# Patient Record
Sex: FEMALE | URBAN - METROPOLITAN AREA
[De-identification: names, ages, dates, MRNs, and addresses within clinical notes are randomized per-mention and may not be internally consistent; named-entity substitution may affect disease eponyms.]

---

## 2018-06-10 ENCOUNTER — EMERGENCY (EMERGENCY)
Facility: HOSPITAL | Age: 78
LOS: 1 days | Discharge: HOME | End: 2018-06-10
Attending: EMERGENCY MEDICINE | Admitting: EMERGENCY MEDICINE
Payer: MEDICAID

## 2018-06-10 VITALS
HEART RATE: 89 BPM | SYSTOLIC BLOOD PRESSURE: 159 MMHG | TEMPERATURE: 98 F | DIASTOLIC BLOOD PRESSURE: 91 MMHG | OXYGEN SATURATION: 98 % | RESPIRATION RATE: 16 BRPM

## 2018-06-10 VITALS
RESPIRATION RATE: 16 BRPM | TEMPERATURE: 99 F | OXYGEN SATURATION: 99 % | HEART RATE: 61 BPM | DIASTOLIC BLOOD PRESSURE: 88 MMHG | SYSTOLIC BLOOD PRESSURE: 170 MMHG

## 2018-06-10 LAB
ALBUMIN SERPL ELPH-MCNC: 4.3 G/DL — SIGNIFICANT CHANGE UP (ref 3.3–5)
ALP SERPL-CCNC: 84 U/L — SIGNIFICANT CHANGE UP (ref 40–120)
ALT FLD-CCNC: 11 U/L — SIGNIFICANT CHANGE UP (ref 4–33)
APPEARANCE UR: CLEAR — SIGNIFICANT CHANGE UP
AST SERPL-CCNC: 21 U/L — SIGNIFICANT CHANGE UP (ref 4–32)
BACTERIA # UR AUTO: SIGNIFICANT CHANGE UP
BASE EXCESS BLDV CALC-SCNC: 7.1 MMOL/L — SIGNIFICANT CHANGE UP
BASOPHILS # BLD AUTO: 0.01 K/UL — SIGNIFICANT CHANGE UP (ref 0–0.2)
BASOPHILS NFR BLD AUTO: 0.2 % — SIGNIFICANT CHANGE UP (ref 0–2)
BILIRUB SERPL-MCNC: 0.5 MG/DL — SIGNIFICANT CHANGE UP (ref 0.2–1.2)
BILIRUB UR-MCNC: NEGATIVE — SIGNIFICANT CHANGE UP
BLOOD GAS VENOUS - CREATININE: 0.41 MG/DL — LOW (ref 0.5–1.3)
BLOOD UR QL VISUAL: NEGATIVE — SIGNIFICANT CHANGE UP
BUN SERPL-MCNC: 8 MG/DL — SIGNIFICANT CHANGE UP (ref 7–23)
CALCIUM SERPL-MCNC: 9 MG/DL — SIGNIFICANT CHANGE UP (ref 8.4–10.5)
CHLORIDE BLDV-SCNC: 100 MMOL/L — SIGNIFICANT CHANGE UP (ref 96–108)
CHLORIDE SERPL-SCNC: 95 MMOL/L — LOW (ref 98–107)
CO2 SERPL-SCNC: 30 MMOL/L — SIGNIFICANT CHANGE UP (ref 22–31)
COLOR SPEC: SIGNIFICANT CHANGE UP
CREAT SERPL-MCNC: 0.49 MG/DL — LOW (ref 0.5–1.3)
EOSINOPHIL # BLD AUTO: 0.08 K/UL — SIGNIFICANT CHANGE UP (ref 0–0.5)
EOSINOPHIL NFR BLD AUTO: 1.4 % — SIGNIFICANT CHANGE UP (ref 0–6)
GAS PNL BLDV: 133 MMOL/L — LOW (ref 136–146)
GLUCOSE BLDV-MCNC: 101 — HIGH (ref 70–99)
GLUCOSE SERPL-MCNC: 100 MG/DL — HIGH (ref 70–99)
GLUCOSE UR-MCNC: NEGATIVE — SIGNIFICANT CHANGE UP
HCO3 BLDV-SCNC: 29 MMOL/L — HIGH (ref 20–27)
HCT VFR BLD CALC: 35.1 % — SIGNIFICANT CHANGE UP (ref 34.5–45)
HCT VFR BLDV CALC: 38.3 % — SIGNIFICANT CHANGE UP (ref 34.5–45)
HGB BLD-MCNC: 11.8 G/DL — SIGNIFICANT CHANGE UP (ref 11.5–15.5)
HGB BLDV-MCNC: 12.5 G/DL — SIGNIFICANT CHANGE UP (ref 11.5–15.5)
IMM GRANULOCYTES # BLD AUTO: 0.02 # — SIGNIFICANT CHANGE UP
IMM GRANULOCYTES NFR BLD AUTO: 0.3 % — SIGNIFICANT CHANGE UP (ref 0–1.5)
KETONES UR-MCNC: NEGATIVE — SIGNIFICANT CHANGE UP
LACTATE BLDV-MCNC: 1.3 MMOL/L — SIGNIFICANT CHANGE UP (ref 0.5–2)
LEUKOCYTE ESTERASE UR-ACNC: NEGATIVE — SIGNIFICANT CHANGE UP
LYMPHOCYTES # BLD AUTO: 1.29 K/UL — SIGNIFICANT CHANGE UP (ref 1–3.3)
LYMPHOCYTES # BLD AUTO: 21.9 % — SIGNIFICANT CHANGE UP (ref 13–44)
MCHC RBC-ENTMCNC: 28.5 PG — SIGNIFICANT CHANGE UP (ref 27–34)
MCHC RBC-ENTMCNC: 33.6 % — SIGNIFICANT CHANGE UP (ref 32–36)
MCV RBC AUTO: 84.8 FL — SIGNIFICANT CHANGE UP (ref 80–100)
MONOCYTES # BLD AUTO: 0.52 K/UL — SIGNIFICANT CHANGE UP (ref 0–0.9)
MONOCYTES NFR BLD AUTO: 8.8 % — SIGNIFICANT CHANGE UP (ref 2–14)
MUCOUS THREADS # UR AUTO: SIGNIFICANT CHANGE UP
NEUTROPHILS # BLD AUTO: 3.97 K/UL — SIGNIFICANT CHANGE UP (ref 1.8–7.4)
NEUTROPHILS NFR BLD AUTO: 67.4 % — SIGNIFICANT CHANGE UP (ref 43–77)
NITRITE UR-MCNC: NEGATIVE — SIGNIFICANT CHANGE UP
NRBC # FLD: 0 — SIGNIFICANT CHANGE UP
PCO2 BLDV: 54 MMHG — HIGH (ref 41–51)
PH BLDV: 7.39 PH — SIGNIFICANT CHANGE UP (ref 7.32–7.43)
PH UR: 8 — SIGNIFICANT CHANGE UP (ref 4.6–8)
PLATELET # BLD AUTO: 274 K/UL — SIGNIFICANT CHANGE UP (ref 150–400)
PMV BLD: 10.4 FL — SIGNIFICANT CHANGE UP (ref 7–13)
PO2 BLDV: 33 MMHG — LOW (ref 35–40)
POTASSIUM BLDV-SCNC: 4.3 MMOL/L — SIGNIFICANT CHANGE UP (ref 3.4–4.5)
POTASSIUM SERPL-MCNC: 4 MMOL/L — SIGNIFICANT CHANGE UP (ref 3.5–5.3)
POTASSIUM SERPL-SCNC: 4 MMOL/L — SIGNIFICANT CHANGE UP (ref 3.5–5.3)
PROT SERPL-MCNC: 7.2 G/DL — SIGNIFICANT CHANGE UP (ref 6–8.3)
PROT UR-MCNC: NEGATIVE MG/DL — SIGNIFICANT CHANGE UP
RBC # BLD: 4.14 M/UL — SIGNIFICANT CHANGE UP (ref 3.8–5.2)
RBC # FLD: 12.9 % — SIGNIFICANT CHANGE UP (ref 10.3–14.5)
RBC CASTS # UR COMP ASSIST: SIGNIFICANT CHANGE UP (ref 0–?)
SAO2 % BLDV: 58.1 % — LOW (ref 60–85)
SODIUM SERPL-SCNC: 137 MMOL/L — SIGNIFICANT CHANGE UP (ref 135–145)
SP GR SPEC: 1.01 — SIGNIFICANT CHANGE UP (ref 1–1.04)
UROBILINOGEN FLD QL: NORMAL MG/DL — SIGNIFICANT CHANGE UP
WBC # BLD: 5.89 K/UL — SIGNIFICANT CHANGE UP (ref 3.8–10.5)
WBC # FLD AUTO: 5.89 K/UL — SIGNIFICANT CHANGE UP (ref 3.8–10.5)
WBC UR QL: SIGNIFICANT CHANGE UP (ref 0–?)

## 2018-06-10 PROCEDURE — 99053 MED SERV 10PM-8AM 24 HR FAC: CPT

## 2018-06-10 PROCEDURE — 99283 EMERGENCY DEPT VISIT LOW MDM: CPT | Mod: 25

## 2018-06-10 RX ORDER — ACETAMINOPHEN 500 MG
650 TABLET ORAL ONCE
Qty: 0 | Refills: 0 | Status: DISCONTINUED | OUTPATIENT
Start: 2018-06-10 | End: 2018-06-14

## 2018-06-10 NOTE — ED ADULT NURSE NOTE - OBJECTIVE STATEMENT
Pt sitting pola with [facility] members at bedside. Pt limited ENglish Polish speaking. Translation service offered pt Indicating Daughter Abeba for translation. As per Daughter She has been c/o urinary freq and recently just had endoscopy procedure because she has had abd pains...to rule out ulcers. so she came tonight because of both abd pains and urinary freq. She has no pains now.......intermittent. " vs as noted, urine cup provided Awaits further MD Rodriguez.

## 2018-06-10 NOTE — ED PROVIDER NOTE - OBJECTIVE STATEMENT
76 yo female with a h/o hyponatremia c/o urinary frequency and urgency x 2 days. No hematuria, flank pain, dysuria, fevers or chills. No abdominal pain, nausea or vomiting. Pt endorses that she had a low sodium on outpatient labs last week.

## 2018-06-11 LAB
BACTERIA UR CULT: SIGNIFICANT CHANGE UP
SPECIMEN SOURCE: SIGNIFICANT CHANGE UP

## 2021-05-22 ENCOUNTER — INPATIENT (INPATIENT)
Facility: HOSPITAL | Age: 81
LOS: 4 days | Discharge: HOME HEALTH | End: 2021-05-27
Attending: INTERNAL MEDICINE | Admitting: INTERNAL MEDICINE
Payer: MEDICARE

## 2021-05-22 VITALS
RESPIRATION RATE: 18 BRPM | WEIGHT: 139.99 LBS | SYSTOLIC BLOOD PRESSURE: 167 MMHG | HEIGHT: 62 IN | HEART RATE: 71 BPM | DIASTOLIC BLOOD PRESSURE: 94 MMHG | OXYGEN SATURATION: 96 %

## 2021-05-22 LAB
ALBUMIN SERPL ELPH-MCNC: 3.8 G/DL — SIGNIFICANT CHANGE UP (ref 3.3–5)
ALP SERPL-CCNC: 111 U/L — SIGNIFICANT CHANGE UP (ref 40–120)
ALT FLD-CCNC: 30 U/L — SIGNIFICANT CHANGE UP (ref 12–78)
ANION GAP SERPL CALC-SCNC: 11 MMOL/L — SIGNIFICANT CHANGE UP (ref 5–17)
ANION GAP SERPL CALC-SCNC: 12 MMOL/L — SIGNIFICANT CHANGE UP (ref 5–17)
ANION GAP SERPL CALC-SCNC: 18 MMOL/L — HIGH (ref 5–17)
APPEARANCE UR: CLEAR — SIGNIFICANT CHANGE UP
AST SERPL-CCNC: 30 U/L — SIGNIFICANT CHANGE UP (ref 15–37)
BACTERIA # UR AUTO: ABNORMAL
BASOPHILS # BLD AUTO: 0 K/UL — SIGNIFICANT CHANGE UP (ref 0–0.2)
BASOPHILS NFR BLD AUTO: 0 % — SIGNIFICANT CHANGE UP (ref 0–2)
BILIRUB SERPL-MCNC: 1.2 MG/DL — SIGNIFICANT CHANGE UP (ref 0.2–1.2)
BILIRUB UR-MCNC: NEGATIVE — SIGNIFICANT CHANGE UP
BUN SERPL-MCNC: 8 MG/DL — SIGNIFICANT CHANGE UP (ref 7–23)
CALCIUM SERPL-MCNC: 8.3 MG/DL — LOW (ref 8.5–10.1)
CALCIUM SERPL-MCNC: 8.7 MG/DL — SIGNIFICANT CHANGE UP (ref 8.5–10.1)
CALCIUM SERPL-MCNC: 8.8 MG/DL — SIGNIFICANT CHANGE UP (ref 8.5–10.1)
CHLORIDE SERPL-SCNC: 79 MMOL/L — LOW (ref 96–108)
CHLORIDE SERPL-SCNC: 79 MMOL/L — LOW (ref 96–108)
CHLORIDE SERPL-SCNC: 81 MMOL/L — LOW (ref 96–108)
CO2 SERPL-SCNC: 23 MMOL/L — SIGNIFICANT CHANGE UP (ref 22–31)
CO2 SERPL-SCNC: 28 MMOL/L — SIGNIFICANT CHANGE UP (ref 22–31)
CO2 SERPL-SCNC: 29 MMOL/L — SIGNIFICANT CHANGE UP (ref 22–31)
COLOR SPEC: YELLOW — SIGNIFICANT CHANGE UP
CREAT SERPL-MCNC: 0.42 MG/DL — LOW (ref 0.5–1.3)
CREAT SERPL-MCNC: 0.56 MG/DL — SIGNIFICANT CHANGE UP (ref 0.5–1.3)
CREAT SERPL-MCNC: 0.84 MG/DL — SIGNIFICANT CHANGE UP (ref 0.5–1.3)
DIFF PNL FLD: ABNORMAL
ELLIPTOCYTES BLD QL SMEAR: SLIGHT — SIGNIFICANT CHANGE UP
EOSINOPHIL # BLD AUTO: 0 K/UL — SIGNIFICANT CHANGE UP (ref 0–0.5)
EOSINOPHIL NFR BLD AUTO: 0 % — SIGNIFICANT CHANGE UP (ref 0–6)
FLUAV AG NPH QL: SIGNIFICANT CHANGE UP
FLUBV AG NPH QL: SIGNIFICANT CHANGE UP
GLUCOSE BLDC GLUCOMTR-MCNC: 164 MG/DL — HIGH (ref 70–99)
GLUCOSE SERPL-MCNC: 121 MG/DL — HIGH (ref 70–99)
GLUCOSE SERPL-MCNC: 138 MG/DL — HIGH (ref 70–99)
GLUCOSE SERPL-MCNC: 152 MG/DL — HIGH (ref 70–99)
GLUCOSE UR QL: NEGATIVE MG/DL — SIGNIFICANT CHANGE UP
HCT VFR BLD CALC: 38.4 % — SIGNIFICANT CHANGE UP (ref 34.5–45)
HGB BLD-MCNC: 13.2 G/DL — SIGNIFICANT CHANGE UP (ref 11.5–15.5)
HYALINE CASTS # UR AUTO: ABNORMAL /LPF
KETONES UR-MCNC: ABNORMAL
LEUKOCYTE ESTERASE UR-ACNC: NEGATIVE — SIGNIFICANT CHANGE UP
LG PLATELETS BLD QL AUTO: SLIGHT — SIGNIFICANT CHANGE UP
LIDOCAIN IGE QN: 126 U/L — SIGNIFICANT CHANGE UP (ref 73–393)
LYMPHOCYTES # BLD AUTO: 1.47 K/UL — SIGNIFICANT CHANGE UP (ref 1–3.3)
LYMPHOCYTES # BLD AUTO: 25 % — SIGNIFICANT CHANGE UP (ref 13–44)
MAGNESIUM SERPL-MCNC: 2 MG/DL — SIGNIFICANT CHANGE UP (ref 1.6–2.6)
MANUAL SMEAR VERIFICATION: SIGNIFICANT CHANGE UP
MCHC RBC-ENTMCNC: 27.4 PG — SIGNIFICANT CHANGE UP (ref 27–34)
MCHC RBC-ENTMCNC: 34.4 GM/DL — SIGNIFICANT CHANGE UP (ref 32–36)
MCV RBC AUTO: 79.8 FL — LOW (ref 80–100)
METAMYELOCYTES # FLD: 2 % — HIGH (ref 0–0)
MICROCYTES BLD QL: SLIGHT — SIGNIFICANT CHANGE UP
MONOCYTES # BLD AUTO: 0.35 K/UL — SIGNIFICANT CHANGE UP (ref 0–0.9)
MONOCYTES NFR BLD AUTO: 6 % — SIGNIFICANT CHANGE UP (ref 2–14)
NEUTROPHILS # BLD AUTO: 3.94 K/UL — SIGNIFICANT CHANGE UP (ref 1.8–7.4)
NEUTROPHILS NFR BLD AUTO: 66 % — SIGNIFICANT CHANGE UP (ref 43–77)
NEUTS BAND # BLD: 1 % — SIGNIFICANT CHANGE UP (ref 0–8)
NITRITE UR-MCNC: NEGATIVE — SIGNIFICANT CHANGE UP
NRBC # BLD: 0 /100 — SIGNIFICANT CHANGE UP (ref 0–0)
NRBC # BLD: SIGNIFICANT CHANGE UP /100 WBCS (ref 0–0)
OSMOLALITY SERPL: 245 MOSMOL/KG — LOW (ref 280–301)
OSMOLALITY UR: 414 MOSM/KG — SIGNIFICANT CHANGE UP (ref 50–1200)
OVALOCYTES BLD QL SMEAR: SLIGHT — SIGNIFICANT CHANGE UP
PH UR: 7 — SIGNIFICANT CHANGE UP (ref 5–8)
PLAT MORPH BLD: ABNORMAL
PLATELET # BLD AUTO: 288 K/UL — SIGNIFICANT CHANGE UP (ref 150–400)
PLATELET CLUMP BLD QL SMEAR: ABNORMAL
PLATELET COUNT - ESTIMATE: NORMAL — SIGNIFICANT CHANGE UP
POTASSIUM SERPL-MCNC: 2.4 MMOL/L — CRITICAL LOW (ref 3.5–5.3)
POTASSIUM SERPL-MCNC: 2.6 MMOL/L — CRITICAL LOW (ref 3.5–5.3)
POTASSIUM SERPL-MCNC: 3.3 MMOL/L — LOW (ref 3.5–5.3)
POTASSIUM SERPL-SCNC: 2.4 MMOL/L — CRITICAL LOW (ref 3.5–5.3)
POTASSIUM SERPL-SCNC: 2.6 MMOL/L — CRITICAL LOW (ref 3.5–5.3)
POTASSIUM SERPL-SCNC: 3.3 MMOL/L — LOW (ref 3.5–5.3)
PROT SERPL-MCNC: 7.6 GM/DL — SIGNIFICANT CHANGE UP (ref 6–8.3)
PROT UR-MCNC: 30 MG/DL
RBC # BLD: 4.81 M/UL — SIGNIFICANT CHANGE UP (ref 3.8–5.2)
RBC # FLD: 11.7 % — SIGNIFICANT CHANGE UP (ref 10.3–14.5)
RBC BLD AUTO: ABNORMAL
RBC CASTS # UR COMP ASSIST: SIGNIFICANT CHANGE UP /HPF (ref 0–4)
SARS-COV-2 RNA SPEC QL NAA+PROBE: DETECTED
SCHISTOCYTES BLD QL AUTO: SLIGHT — SIGNIFICANT CHANGE UP
SMUDGE CELLS # BLD: PRESENT — SIGNIFICANT CHANGE UP
SODIUM SERPL-SCNC: 120 MMOL/L — CRITICAL LOW (ref 135–145)
SODIUM UR-SCNC: 130 MMOL/L — SIGNIFICANT CHANGE UP
SP GR SPEC: 1 — LOW (ref 1.01–1.02)
TROPONIN I SERPL-MCNC: <.015 NG/ML — SIGNIFICANT CHANGE UP (ref 0.01–0.04)
TSH SERPL-MCNC: 1.01 UIU/ML — SIGNIFICANT CHANGE UP (ref 0.36–3.74)
UROBILINOGEN FLD QL: NEGATIVE MG/DL — SIGNIFICANT CHANGE UP
WBC # BLD: 5.88 K/UL — SIGNIFICANT CHANGE UP (ref 3.8–10.5)
WBC # FLD AUTO: 5.88 K/UL — SIGNIFICANT CHANGE UP (ref 3.8–10.5)
WBC UR QL: SIGNIFICANT CHANGE UP

## 2021-05-22 PROCEDURE — 99291 CRITICAL CARE FIRST HOUR: CPT | Mod: CS

## 2021-05-22 PROCEDURE — 71045 X-RAY EXAM CHEST 1 VIEW: CPT | Mod: 26

## 2021-05-22 PROCEDURE — 70450 CT HEAD/BRAIN W/O DYE: CPT | Mod: 26,MH

## 2021-05-22 PROCEDURE — 93010 ELECTROCARDIOGRAM REPORT: CPT | Mod: 76

## 2021-05-22 PROCEDURE — 72125 CT NECK SPINE W/O DYE: CPT | Mod: 26,MH

## 2021-05-22 RX ORDER — POTASSIUM CHLORIDE 20 MEQ
10 PACKET (EA) ORAL
Refills: 0 | Status: COMPLETED | OUTPATIENT
Start: 2021-05-22 | End: 2021-05-22

## 2021-05-22 RX ORDER — ONDANSETRON 8 MG/1
4 TABLET, FILM COATED ORAL ONCE
Refills: 0 | Status: COMPLETED | OUTPATIENT
Start: 2021-05-22 | End: 2021-05-22

## 2021-05-22 RX ORDER — SODIUM CHLORIDE 5 G/100ML
500 INJECTION, SOLUTION INTRAVENOUS
Refills: 0 | Status: DISCONTINUED | OUTPATIENT
Start: 2021-05-22 | End: 2021-05-23

## 2021-05-22 RX ORDER — HEPARIN SODIUM 5000 [USP'U]/ML
5000 INJECTION INTRAVENOUS; SUBCUTANEOUS EVERY 12 HOURS
Refills: 0 | Status: DISCONTINUED | OUTPATIENT
Start: 2021-05-22 | End: 2021-05-27

## 2021-05-22 RX ORDER — DEXAMETHASONE 0.5 MG/5ML
6 ELIXIR ORAL DAILY
Refills: 0 | Status: DISCONTINUED | OUTPATIENT
Start: 2021-05-22 | End: 2021-05-22

## 2021-05-22 RX ORDER — CHLORHEXIDINE GLUCONATE 213 G/1000ML
1 SOLUTION TOPICAL
Refills: 0 | Status: DISCONTINUED | OUTPATIENT
Start: 2021-05-22 | End: 2021-05-27

## 2021-05-22 RX ORDER — POTASSIUM CHLORIDE 20 MEQ
40 PACKET (EA) ORAL ONCE
Refills: 0 | Status: DISCONTINUED | OUTPATIENT
Start: 2021-05-22 | End: 2021-05-22

## 2021-05-22 RX ORDER — SODIUM CHLORIDE 5 G/100ML
100 INJECTION, SOLUTION INTRAVENOUS
Refills: 0 | Status: DISCONTINUED | OUTPATIENT
Start: 2021-05-22 | End: 2021-05-22

## 2021-05-22 RX ORDER — AMLODIPINE BESYLATE 2.5 MG/1
10 TABLET ORAL DAILY
Refills: 0 | Status: DISCONTINUED | OUTPATIENT
Start: 2021-05-22 | End: 2021-05-27

## 2021-05-22 RX ORDER — SODIUM CHLORIDE 5 G/100ML
500 INJECTION, SOLUTION INTRAVENOUS
Refills: 0 | Status: DISCONTINUED | OUTPATIENT
Start: 2021-05-22 | End: 2021-05-22

## 2021-05-22 RX ORDER — POTASSIUM CHLORIDE 20 MEQ
40 PACKET (EA) ORAL ONCE
Refills: 0 | Status: COMPLETED | OUTPATIENT
Start: 2021-05-22 | End: 2021-05-22

## 2021-05-22 RX ORDER — ALBUTEROL 90 UG/1
2 AEROSOL, METERED ORAL EVERY 6 HOURS
Refills: 0 | Status: DISCONTINUED | OUTPATIENT
Start: 2021-05-22 | End: 2021-05-27

## 2021-05-22 RX ORDER — DESMOPRESSIN ACETATE 0.1 MG/1
2 TABLET ORAL EVERY 12 HOURS
Refills: 0 | Status: DISCONTINUED | OUTPATIENT
Start: 2021-05-22 | End: 2021-05-22

## 2021-05-22 RX ORDER — DORZOLAMIDE HYDROCHLORIDE 20 MG/ML
1 SOLUTION/ DROPS OPHTHALMIC THREE TIMES A DAY
Refills: 0 | Status: DISCONTINUED | OUTPATIENT
Start: 2021-05-22 | End: 2021-05-27

## 2021-05-22 RX ORDER — DEXAMETHASONE 0.5 MG/5ML
6 ELIXIR ORAL ONCE
Refills: 0 | Status: COMPLETED | OUTPATIENT
Start: 2021-05-22 | End: 2021-05-22

## 2021-05-22 RX ORDER — SODIUM CHLORIDE 5 G/100ML
100 INJECTION, SOLUTION INTRAVENOUS
Refills: 0 | Status: COMPLETED | OUTPATIENT
Start: 2021-05-22 | End: 2021-05-22

## 2021-05-22 RX ORDER — SODIUM CHLORIDE 9 MG/ML
1000 INJECTION INTRAMUSCULAR; INTRAVENOUS; SUBCUTANEOUS
Refills: 0 | Status: DISCONTINUED | OUTPATIENT
Start: 2021-05-22 | End: 2021-05-22

## 2021-05-22 RX ORDER — BUDESONIDE AND FORMOTEROL FUMARATE DIHYDRATE 160; 4.5 UG/1; UG/1
2 AEROSOL RESPIRATORY (INHALATION)
Refills: 0 | Status: DISCONTINUED | OUTPATIENT
Start: 2021-05-22 | End: 2021-05-27

## 2021-05-22 RX ORDER — POTASSIUM CHLORIDE 20 MEQ
40 PACKET (EA) ORAL DAILY
Refills: 0 | Status: DISCONTINUED | OUTPATIENT
Start: 2021-05-22 | End: 2021-05-22

## 2021-05-22 RX ADMIN — AMLODIPINE BESYLATE 10 MILLIGRAM(S): 2.5 TABLET ORAL at 13:49

## 2021-05-22 RX ADMIN — Medication 40 MILLIEQUIVALENT(S): at 14:59

## 2021-05-22 RX ADMIN — Medication 100 MILLIEQUIVALENT(S): at 23:20

## 2021-05-22 RX ADMIN — Medication 100 MILLIEQUIVALENT(S): at 13:51

## 2021-05-22 RX ADMIN — SODIUM CHLORIDE 125 MILLILITER(S): 9 INJECTION INTRAMUSCULAR; INTRAVENOUS; SUBCUTANEOUS at 09:30

## 2021-05-22 RX ADMIN — DORZOLAMIDE HYDROCHLORIDE 1 DROP(S): 20 SOLUTION/ DROPS OPHTHALMIC at 14:59

## 2021-05-22 RX ADMIN — Medication 100 MILLIEQUIVALENT(S): at 16:47

## 2021-05-22 RX ADMIN — CHLORHEXIDINE GLUCONATE 1 APPLICATION(S): 213 SOLUTION TOPICAL at 12:41

## 2021-05-22 RX ADMIN — ONDANSETRON 4 MILLIGRAM(S): 8 TABLET, FILM COATED ORAL at 17:38

## 2021-05-22 RX ADMIN — Medication 100 MILLIEQUIVALENT(S): at 09:30

## 2021-05-22 RX ADMIN — Medication 100 MILLIEQUIVALENT(S): at 12:40

## 2021-05-22 RX ADMIN — SODIUM CHLORIDE 30 MILLILITER(S): 5 INJECTION, SOLUTION INTRAVENOUS at 14:03

## 2021-05-22 RX ADMIN — SODIUM CHLORIDE 1000 MILLILITER(S): 9 INJECTION INTRAMUSCULAR; INTRAVENOUS; SUBCUTANEOUS at 10:05

## 2021-05-22 RX ADMIN — SODIUM CHLORIDE 30 MILLILITER(S): 5 INJECTION, SOLUTION INTRAVENOUS at 16:00

## 2021-05-22 RX ADMIN — Medication 100 MILLIEQUIVALENT(S): at 17:22

## 2021-05-22 RX ADMIN — SODIUM CHLORIDE 30 MILLILITER(S): 5 INJECTION, SOLUTION INTRAVENOUS at 15:23

## 2021-05-22 RX ADMIN — HEPARIN SODIUM 5000 UNIT(S): 5000 INJECTION INTRAVENOUS; SUBCUTANEOUS at 17:00

## 2021-05-22 RX ADMIN — Medication 100 MILLIEQUIVALENT(S): at 14:59

## 2021-05-22 RX ADMIN — SODIUM CHLORIDE 40 MILLILITER(S): 5 INJECTION, SOLUTION INTRAVENOUS at 20:20

## 2021-05-22 RX ADMIN — SODIUM CHLORIDE 600 MILLILITER(S): 5 INJECTION, SOLUTION INTRAVENOUS at 09:47

## 2021-05-22 RX ADMIN — Medication 6 MILLIGRAM(S): at 10:29

## 2021-05-22 RX ADMIN — SODIUM CHLORIDE 30 MILLILITER(S): 5 INJECTION, SOLUTION INTRAVENOUS at 17:00

## 2021-05-22 RX ADMIN — Medication 100 MILLIEQUIVALENT(S): at 22:20

## 2021-05-22 RX ADMIN — BUDESONIDE AND FORMOTEROL FUMARATE DIHYDRATE 2 PUFF(S): 160; 4.5 AEROSOL RESPIRATORY (INHALATION) at 17:22

## 2021-05-22 RX ADMIN — Medication 0.25 MILLIGRAM(S): at 20:22

## 2021-05-22 RX ADMIN — DORZOLAMIDE HYDROCHLORIDE 1 DROP(S): 20 SOLUTION/ DROPS OPHTHALMIC at 21:25

## 2021-05-22 RX ADMIN — Medication 100 MILLIEQUIVALENT(S): at 21:05

## 2021-05-22 NOTE — ED PROVIDER NOTE - CARE PLAN
Principal Discharge DX:	New onset seizure   Principal Discharge DX:	Hyponatremia  Secondary Diagnosis:	New onset seizure  Secondary Diagnosis:	Hypokalemia   Principal Discharge DX:	Hyponatremia  Secondary Diagnosis:	New onset seizure  Secondary Diagnosis:	Hypokalemia  Secondary Diagnosis:	COVID-19

## 2021-05-22 NOTE — ED ADULT NURSE NOTE - PMH
Asthma, unspecified asthma severity, unspecified whether complicated, unspecified whether persistent    Essential hypertension

## 2021-05-22 NOTE — ED PROVIDER NOTE - PROGRESS NOTE DETAILS
Pt more arousable, moving arms and sitting up from bed Pt more awake, AOx3, mostly back to baseline per daughter. CN2-12 grossly intact, No pronator drift. Normal finger to nose, No drift UE/LE, no aphasia, no dysarthria. no CW tenderness, ecchymosis, erythema. Pt more awake, AOx3, still a little sleepy, follows commands, mostly back to baseline, per daughter. CN2-12 grossly intact, No pronator drift. Normal finger to nose, No drift UE/LE, no aphasia, no dysarthria. no CW tenderness, ecchymosis, erythema.

## 2021-05-22 NOTE — H&P ADULT - NSICDXPASTMEDICALHX_GEN_ALL_CORE_FT
PAST MEDICAL HISTORY:  Asthma, unspecified asthma severity, unspecified whether complicated, unspecified whether persistent     Essential hypertension

## 2021-05-22 NOTE — ED PROVIDER NOTE - CLINICAL SUMMARY MEDICAL DECISION MAKING FREE TEXT BOX
Pt with seizure likely due to acute drop in sodium over the past week, unclear if within 48 hours, but given pt has been sick for 1 week. However, pt with new onset seizure and still a little drowsy - may be post ictal but could be the drop in sodium. Will give one dose of hypertonic saline and ICU consulted Pt with seizure likely due to acute drop in sodium over the past week, unclear if within 48 hours, but given pt has been sick for 1 week. However, pt with new onset seizure and still a little drowsy - may be post ictal but could be the drop in sodium. Will give one dose of hypertonic saline and ICU consulted    pt also with covid, noted with mild hypoxia at 91%, ICU request decadron.  dw dr gonzales recommends 3% hypertonic saline 1 bolus and will consult.

## 2021-05-22 NOTE — CONSULT NOTE ADULT - SUBJECTIVE AND OBJECTIVE BOX
[facility] NEPHROLOGY SERVICES, [facility]  NEPHROLOGY AND HYPERTENSION  [address]  [address]  [place] [address]  [phone]    MD BERNABE PEÑALOZA MD ANDREY GONCHARUK, MD MADHU KORRAPATI, MD YELENA ROSENBERG, MD BINNY KOSHY, MD CHRISTOPHER CAPUTO, MD EDWARD BOVER, MD      Information from chart:  "Patient is a 80y old  Female who presents with a chief complaint of hyponatremia, seizure, COVID-19 (22 May 2021 12:20)    HPI:  81 YO female with PMH HTN on amlodipine, COPD on symbicort, glaucoma, BIBEMS after reported seizure noted by family this am. Per daughter, pt's eyes rolled back into her head - pt was blue, so daughter started CPR and mouth to mouth.  In the ED, pt was post-ictal - CT head negative, and then pt woke up some - now currently alert, oriented x3, but still slightly lethargic.  Labs significant for sodium of 120, potassium 2.4, also COVID-19 positive. (Per daughter had a positive contact about 2 weeks ago, also with URI-like symptoms at home over this last week or so).  Pt received 100cc bolus of 3% NS.   Will admit to ICU for severe symptomatic hyponatremia and further monitoring. (22 May 2021 12:20)   "      Patient with a hx of hyponatremia in the past    This admission, patient with avid water intake   Noted Na 120, unclear recent baseline   Poor po intake   Denies HCTZ use    PAST MEDICAL & SURGICAL HISTORY:  Essential hypertension    Asthma, unspecified asthma severity, unspecified whether complicated, unspecified whether persistent      FAMILY HISTORY:    Allergies    No Known Allergies    Intolerances      Home Medications:  amLODIPine:  (22 May 2021 09:01)  Symbicort:  (22 May 2021 09:01)  Tylenol:  (22 May 2021 09:01)    MEDICATIONS  (STANDING):  budesonide 160 MICROgram(s)/formoterol 4.5 MICROgram(s) Inhaler 2 Puff(s) Inhalation two times a day  chlorhexidine 2% Cloths 1 Application(s) Topical <User Schedule>  desmopressin Injectable 2 MICROGram(s) SubCutaneous every 12 hours  heparin   Injectable 5000 Unit(s) SubCutaneous every 12 hours  potassium chloride  10 mEq/100 mL IVPB 10 milliEquivalent(s) IV Intermittent every 1 hour    MEDICATIONS  (PRN):  ALBUTerol    90 MICROgram(s) HFA Inhaler 2 Puff(s) Inhalation every 6 hours PRN Shortness of Breath and/or Wheezing    Vital Signs Last 24 Hrs  T(C): 36.7 (22 May 2021 11:50), Max: 37 (22 May 2021 08:32)  T(F): 98 (22 May 2021 11:50), Max: 98.6 (22 May 2021 08:32)  HR: 76 (22 May 2021 12:03) (62 - 76)  BP: 184/71 (22 May 2021 12:03) (158/89 - 184/71)  BP(mean): 96 (22 May 2021 12:03) (96 - 101)  RR: 18 (22 May 2021 12:03) (16 - 19)  SpO2: 97% (22 May 2021 12:03) (91% - 97%)    Daily Height in cm: 157.48 (22 May 2021 08:02)    Daily     CAPILLARY BLOOD GLUCOSE      POCT Blood Glucose.: 164 mg/dL (22 May 2021 08:04)    PHYSICAL EXAM:      T(C): 36.7 (21 @ 11:50), Max: 37 (21 @ 08:32)  HR: 76 (21 @ 12:03) (62 - 76)  BP: 184/71 (21 @ 12:03) (158/89 - 184/71)  RR: 18 (21 @ 12:03) (16 - 19)  SpO2: 97% (21 @ 12:03) (91% - 97%)  Wt(kg): --  Lungs clear  Heart S1S2  Abd soft NT ND  Extremities:   tr edema                  120<LL>  |  79<L>  |  8   ----------------------------<  152<H>  2.4<LL>   |  23  |  0.84    Ca    8.7      22 May 2021 08:19  Mg     2.0         TPro  7.6  /  Alb  3.8  /  TBili  1.2  /  DBili  x   /  AST  30  /  ALT  30  /  AlkPhos  111                            13.2   5.88  )-----------( 288      ( 22 May 2021 08:19 )             38.4     Creatinine Trend: 0.84<--  Urinalysis Basic - ( 22 May 2021 11:01 )    Color: Yellow / Appearance: Clear / S.005 / pH: x  Gluc: x / Ketone: Trace  / Bili: Negative / Urobili: Negative mg/dL   Blood: x / Protein: 30 mg/dL / Nitrite: Negative   Leuk Esterase: Negative / RBC: 0-2 /HPF / WBC 0-2   Sq Epi: x / Non Sq Epi: x / Bacteria: Few            Assessment   Acute vs acute on chronic Hyponatremia associated with seizures   Dilute urine, poor po intake with psychogenic polydipsia;   R/o SIADH related PNA  COVID    Plan    Discussed with ER Dr. Schmid  100 ml 3% stat  Urine dilute with increasing UO, will add DDAVP  Follow serial Na q 4-6 hrs   Goal Na correction 8 over 24 hrs, 18 over 48  Will follow.  German Goldstein MD [facility] NEPHROLOGY SERVICES, [facility]  NEPHROLOGY AND HYPERTENSION  [address]  [address]  [place] [address]  [phone]    MD BERNABE PEÑALOZA MD ANDREY GONCHARUK, MD MADHU KORRAPATI, MD YELENA ROSENBERG, MD BINNY KOSHY, MD CHRISTOPHER CAPUTO, MD EDWARD BOVER, MD      Information from chart:  "Patient is a 80y old  Female who presents with a chief complaint of hyponatremia, seizure, COVID-19 (22 May 2021 12:20)    HPI:  79 YO female with PMH HTN on amlodipine, COPD on symbicort, glaucoma, BIBEMS after reported seizure noted by family this am. Per daughter, pt's eyes rolled back into her head - pt was blue, so daughter started CPR and mouth to mouth.  In the ED, pt was post-ictal - CT head negative, and then pt woke up some - now currently alert, oriented x3, but still slightly lethargic.  Labs significant for sodium of 120, potassium 2.4, also COVID-19 positive. (Per daughter had a positive contact about 2 weeks ago, also with URI-like symptoms at home over this last week or so).  Pt received 100cc bolus of 3% NS.   Will admit to ICU for severe symptomatic hyponatremia and further monitoring. (22 May 2021 12:20)   "      Patient with a hx of hyponatremia in the past    This admission, patient with avid water intake   Noted Na 120, unclear recent baseline   Poor po intake   Denies HCTZ use    PAST MEDICAL & SURGICAL HISTORY:  Essential hypertension    Asthma, unspecified asthma severity, unspecified whether complicated, unspecified whether persistent      FAMILY HISTORY:    Allergies    No Known Allergies    Intolerances      Home Medications:  amLODIPine:  (22 May 2021 09:01)  Symbicort:  (22 May 2021 09:01)  Tylenol:  (22 May 2021 09:01)    MEDICATIONS  (STANDING):  budesonide 160 MICROgram(s)/formoterol 4.5 MICROgram(s) Inhaler 2 Puff(s) Inhalation two times a day  chlorhexidine 2% Cloths 1 Application(s) Topical <User Schedule>  desmopressin Injectable 2 MICROGram(s) SubCutaneous every 12 hours  heparin   Injectable 5000 Unit(s) SubCutaneous every 12 hours  potassium chloride  10 mEq/100 mL IVPB 10 milliEquivalent(s) IV Intermittent every 1 hour    MEDICATIONS  (PRN):  ALBUTerol    90 MICROgram(s) HFA Inhaler 2 Puff(s) Inhalation every 6 hours PRN Shortness of Breath and/or Wheezing    Vital Signs Last 24 Hrs  T(C): 36.7 (22 May 2021 11:50), Max: 37 (22 May 2021 08:32)  T(F): 98 (22 May 2021 11:50), Max: 98.6 (22 May 2021 08:32)  HR: 76 (22 May 2021 12:03) (62 - 76)  BP: 184/71 (22 May 2021 12:03) (158/89 - 184/71)  BP(mean): 96 (22 May 2021 12:03) (96 - 101)  RR: 18 (22 May 2021 12:03) (16 - 19)  SpO2: 97% (22 May 2021 12:03) (91% - 97%)    Daily Height in cm: 157.48 (22 May 2021 08:02)    Daily     CAPILLARY BLOOD GLUCOSE      POCT Blood Glucose.: 164 mg/dL (22 May 2021 08:04)    PHYSICAL EXAM:      T(C): 36.7 (21 @ 11:50), Max: 37 (21 @ 08:32)  HR: 76 (21 @ 12:03) (62 - 76)  BP: 184/71 (21 @ 12:03) (158/89 - 184/71)  RR: 18 (21 @ 12:03) (16 - 19)  SpO2: 97% (21 @ 12:03) (91% - 97%)  Wt(kg): --  Lungs clear  Heart S1S2  Abd soft NT ND  Extremities:   tr edema                  120<LL>  |  79<L>  |  8   ----------------------------<  152<H>  2.4<LL>   |  23  |  0.84    Ca    8.7      22 May 2021 08:19  Mg     2.0         TPro  7.6  /  Alb  3.8  /  TBili  1.2  /  DBili  x   /  AST  30  /  ALT  30  /  AlkPhos  111                            13.2   5.88  )-----------( 288      ( 22 May 2021 08:19 )             38.4     Creatinine Trend: 0.84<--  Urinalysis Basic - ( 22 May 2021 11:01 )    Color: Yellow / Appearance: Clear / S.005 / pH: x  Gluc: x / Ketone: Trace  / Bili: Negative / Urobili: Negative mg/dL   Blood: x / Protein: 30 mg/dL / Nitrite: Negative   Leuk Esterase: Negative / RBC: 0-2 /HPF / WBC 0-2   Sq Epi: x / Non Sq Epi: x / Bacteria: Few            Assessment   Acute vs acute on chronic Hyponatremia associated with seizures   Dilute urine, poor po intake with psychogenic polydipsia;   R/o SIADH related PNA  COVID    Plan    Discussed with ER Dr. Schmid  100 ml 3% stat  Urine dilute with increasing UO, will add DDAVP  Follow serial Na q 4-6 hrs   Replete K, note will increase Na as well  Goal Na correction 8 over 24 hrs, 18 over 48  Will follow.  German Goldstein MD

## 2021-05-22 NOTE — ED PROVIDER NOTE - PHYSICAL EXAMINATION
Gen: somnolent, eyes open to loud voice, winces to pain   Head: NC, AT, PERRL, normal lids/conjunctiva   ENT: patent oropharynx without erythema/exudate, uvula midline, + tongue bite noted, no active bleeding  Neck: supple, no tenderness/meningismus  Pulm: Bilateral clear BS, normal resp effort  CV: RRR, no M/R/G, +dist pulses   Abd: soft, NT/ND, +BS, no guarding/rebound tenderness  Mskel: no edema/erythema/cyanosis   Skin: no rash, no bruising  Neuro: no facial asymmetry, winces to pain from all 4 extremities Gen: somnolent, eyes open to loud voice, winces to pain   Head: NC, AT, PERRL, normal lids/conjunctiva   ENT: patent oropharynx without erythema/exudate, uvula midline, + tongue bite noted, no active bleeding  Neck: supple, no tenderness/meningismus  Pulm: scant rales  CV: RRR, no M/R/G, +dist pulses   Abd: soft, NT/ND, +BS, no guarding/rebound tenderness  Mskel: no edema/erythema/cyanosis   Skin: no rash, no bruising  Neuro: no facial asymmetry, winces to pain from all 4 extremities

## 2021-05-22 NOTE — ED ADULT NURSE NOTE - NSIMPLEMENTINTERV_GEN_ALL_ED
Implemented All Fall Risk Interventions:  [place] to call system. Call bell, personal items and telephone within reach. Instruct patient to call for assistance. Room bathroom lighting operational. Non-slip footwear when patient is off stretcher. Physically safe environment: no spills, clutter or unnecessary equipment. Stretcher in lowest position, wheels locked, appropriate side rails in place. Provide visual cue, wrist band, yellow gown, etc. Monitor gait and stability. Monitor for mental status changes and reorient to person, place, and time. Review medications for side effects contributing to fall risk. Reinforce activity limits and safety measures with patient and family.

## 2021-05-22 NOTE — ED ADULT NURSE REASSESSMENT NOTE - NS ED NURSE REASSESS COMMENT FT1
ICU team at bedside for evaluation. Hypertonic sodium infusing over 10 minutes into RA #20G. Isotonic saline infusing via L FA #20G, KCL 1/3 infusing through same line. Pt sleepy but oriented x 3. Covid PCR returned - positive. Isolation precautions placed.

## 2021-05-22 NOTE — H&P ADULT - ATTENDING COMMENTS
79 YO female with PMH HTN on amlodipine, COPD on symbicort p/w seizure. Pt w/ recent cold like symptoms and daughter reports today pt had seizure at home. She performed cpr and mouth to mouth for less than a minute before the pt woke up. Presented to ER postictal and found to have hyponatremia. On assessment pt lethargic but answering all questions appropriately. Likely postictal. Denies fevers, HA. Daughter notes pt drinking lots of water and has had episode of hyponatremia in past. Poss SIADH vs psychogenic polydipsia. F/u urine osm and lytes. Received 2nd dose 3% 100cc. Monitor electrolytes level serially. f/u Renal reccs. Pt also +covid -given dose of dexamethasone. Daughter refusing remdesvir for her mother. Will admit to ICU. Prognosis guarded

## 2021-05-22 NOTE — ED PROVIDER NOTE - CRITICAL CARE ATTENDING CONTRIBUTION TO CARE
Patient condition is critical. Time spent on stabilizing patient, placing orders, taking history and examination and speaking to specialists, and spending time discussing with family about life sustaining orders.

## 2021-05-22 NOTE — H&P ADULT - ASSESSMENT
79 YO female with PMH HTN on amlodipine, COPD on symbicort, glaucoma, BIBEMS after reported seizure noted by family this am. Per daughter, pt's eyes rolled back into her head - pt was blue, so daughter started CPR and mouth to mouth for a few seconds before pt came to.  In the ED, pt was post-ictal - CT head negative, and then pt woke up some - now currently alert, oriented x3, but still slightly lethargic.  Labs significant for sodium of 120, potassium 2.4, also COVID-19 positive. (Per daughter had a positive contact about 2 weeks ago, also with URI-like symptoms at home over this last week or so).  Pt received 100cc bolus of 3% NS.   Will admit to ICU for severe symptomatic hyponatremia and further monitoring.      NEURO: s/p seizure and post-ictal phase, now improved - will continue to monitor for any further seizures, continue seizure precautions  RESP: hx of COPD/asthma, tolerating room air at this time, will continue to monitor. CXR appears clear.   CV: resume home antihypertensives  GI: NPO except meds given mental status at present  RENAL: hyponatremia to 120 s/p seizure, s/p 100cc bolus of 3% NS. serial BMP. renal consult noted - for DDAVP at this time. strict intake/output via cooln  ID: COVID positive but tolerating room air at present s/p 1 dose dexamethasone in ED - per pharmacy [facility] pt will not receive further steroids unless requires oxygen  DVT: heparin ppx  GOC: full  DISPO: ICU    DW daughter Ez at bedside 81 YO female with PMH HTN on amlodipine, COPD on symbicort, glaucoma, BIBEMS after reported seizure noted by family this am. Per daughter, pt's eyes rolled back into her head - pt was blue, so daughter started CPR and mouth to mouth for a few seconds before pt came to.  In the ED, pt was post-ictal - CT head negative, and then pt woke up some - now currently alert, oriented x3, but still slightly lethargic.  Labs significant for sodium of 120, potassium 2.4, also COVID-19 positive. (Per daughter had a positive contact about 2 weeks ago, also with URI-like symptoms at home over this last week or so).  Pt received 100cc bolus of 3% NS.   Will admit to ICU for severe symptomatic hyponatremia and further monitoring.      NEURO: s/p seizure and post-ictal phase, now improved - will continue to monitor for any further seizures, continue seizure precautions  RESP: hx of COPD/asthma, tolerating room air at this time, will continue to monitor. CXR appears clear.   CV: resume home antihypertensives  GI: NPO except meds given mental status at present  RENAL: hyponatremia to 120 s/p seizure, s/p 100cc bolus of 3% NS. serial BMP. renal consult noted - for DDAVP at this time. strict intake/output via colon  ID: COVID positive but tolerating room air at present s/p 1 dose dexamethasone in ED - per pharmacy [facility] pt will not receive further steroids unless requires oxygen. continue to monitor for now, trend IM  DVT: heparin ppx  GOC: full  DISPO: ICU    DW daughter Ez at bedside

## 2021-05-22 NOTE — ED PROVIDER NOTE - OBJECTIVE STATEMENT
81yo female with pmh HTN on amlodipine, COPD on symbicort, bibems after reported seizure noted by family this am. + bit tongue. Pt currently appears post ictal. No prior h/o seizure per ems. Per EMS, family noted pt unresponsive and felt she was going blue, so gave her CPR.  Per ems, pt has been getting over a URI, pending covid test result. 81yo female with pmh HTN on amlodipine, COPD on symbicort, bibems after reported seizure noted by family this am. + bit tongue. Pt currently appears post ictal. No prior h/o seizure per ems. Per EMS, family noted pt unresponsive and felt she was going blue, so gave her CPR.  Per ems, pt has been getting over a URI, pending covid test result. Pt has not been vaccinated    daughter arrived, states pt tensed up, eyes rolled back, and lasted ~ 30 secs. THen pt was "blue and she performed CPR". Per daughter, pt has cough, occasionally productive for the past week, feeling sick, sour taste in mouth, occasional diarrhea and feeling thirsty for past week. Had neg Covid test, but family concerned as a + covid positive person came by last week. Pt has been taking her normal meds, tylenol/advile and robitussin PM 79yo female with pmh HTN on amlodipine, COPD on symbicort, bibems after reported seizure noted by family this am. + bit tongue. Pt currently appears post ictal. No prior h/o seizure per ems. Per EMS, family noted pt unresponsive and felt she was going blue, so gave her CPR.  Per ems, pt has been getting over a URI, pending covid test result. Pt has not been vaccinated    daughter arrived, states pt tensed up, eyes rolled back, and lasted ~ 30 secs. THen pt was "blue and she performed CPR". Per daughter, pt has cough, occasionally productive for the past week, feeling sick, sour taste in mouth, occasional diarrhea and feeling thirsty and drinking a lot of water for past week. Had neg Covid test, but family concerned as a + covid positive person came by last week. Pt has been taking her normal meds, tylenol/advile and robitussin PM

## 2021-05-22 NOTE — H&P ADULT - HISTORY OF PRESENT ILLNESS
81 YO female with PMH HTN on amlodipine, COPD on symbicort, glaucoma, BIBEMS after reported seizure noted by family this am. Per daughter, pt's eyes rolled back into her head - pt was blue, so daughter started CPR and mouth to mouth.  In the ED, pt was post-ictal - CT head negative, and then pt woke up some - now currently alert, oriented x3, but still slightly lethargic.  Labs significant for sodium of 120, potassium 2.4, also COVID-19 positive. (Per daughter had a positive contact about 2 weeks ago, also with URI-like symptoms at home over this last week or so).  Pt received 100cc bolus of 3% NS.   Will admit to ICU for severe symptomatic hyponatremia and further monitoring. 79 YO female with PMH HTN on amlodipine, COPD on symbicort, glaucoma, BIBEMS after reported seizure noted by family this am. Per daughter, pt's eyes rolled back into her head - pt was blue, so daughter started CPR and mouth to mouth for a few seconds before pt came to.  In the ED, pt was post-ictal - CT head negative, and then pt woke up some - now currently alert, oriented x3, but still slightly lethargic.  Labs significant for sodium of 120, potassium 2.4, also COVID-19 positive. (Per daughter had a positive contact about 2 weeks ago, also with URI-like symptoms at home over this last week or so).  Pt received 100cc bolus of 3% NS.   Will admit to ICU for severe symptomatic hyponatremia and further monitoring.

## 2021-05-22 NOTE — ED ADULT NURSE NOTE - OBJECTIVE STATEMENT
BIBA c/o episode of tensing up with eyes rolled back & cyanotic  followed by period of unresponsiveness concerning for seizure activity s/p family performed CPR in the setting of cough/weakness x 1 week. Post-ictal in ED. Blood observed on left side of tongue s/p bite. Critical lab result revealed Na 120, K 2.4. Pt transferred to isolation for rule out Covid. Will be screened for ICU for hyponatremia induced seizure.

## 2021-05-23 DIAGNOSIS — I10 ESSENTIAL (PRIMARY) HYPERTENSION: ICD-10-CM

## 2021-05-23 DIAGNOSIS — E87.1 HYPO-OSMOLALITY AND HYPONATREMIA: ICD-10-CM

## 2021-05-23 DIAGNOSIS — J45.909 UNSPECIFIED ASTHMA, UNCOMPLICATED: ICD-10-CM

## 2021-05-23 DIAGNOSIS — R56.9 UNSPECIFIED CONVULSIONS: ICD-10-CM

## 2021-05-23 DIAGNOSIS — U07.1 COVID-19: ICD-10-CM

## 2021-05-23 DIAGNOSIS — E87.6 HYPOKALEMIA: ICD-10-CM

## 2021-05-23 LAB
ALBUMIN SERPL ELPH-MCNC: 3.6 G/DL — SIGNIFICANT CHANGE UP (ref 3.3–5)
ALBUMIN SERPL ELPH-MCNC: 3.6 G/DL — SIGNIFICANT CHANGE UP (ref 3.3–5)
ALP SERPL-CCNC: 100 U/L — SIGNIFICANT CHANGE UP (ref 40–120)
ALP SERPL-CCNC: 96 U/L — SIGNIFICANT CHANGE UP (ref 40–120)
ALT FLD-CCNC: 29 U/L — SIGNIFICANT CHANGE UP (ref 12–78)
ALT FLD-CCNC: 29 U/L — SIGNIFICANT CHANGE UP (ref 12–78)
ANION GAP SERPL CALC-SCNC: 8 MMOL/L — SIGNIFICANT CHANGE UP (ref 5–17)
ANION GAP SERPL CALC-SCNC: 8 MMOL/L — SIGNIFICANT CHANGE UP (ref 5–17)
ANION GAP SERPL CALC-SCNC: 9 MMOL/L — SIGNIFICANT CHANGE UP (ref 5–17)
ANION GAP SERPL CALC-SCNC: 9 MMOL/L — SIGNIFICANT CHANGE UP (ref 5–17)
AST SERPL-CCNC: 33 U/L — SIGNIFICANT CHANGE UP (ref 15–37)
AST SERPL-CCNC: 35 U/L — SIGNIFICANT CHANGE UP (ref 15–37)
BILIRUB SERPL-MCNC: 1.1 MG/DL — SIGNIFICANT CHANGE UP (ref 0.2–1.2)
BILIRUB SERPL-MCNC: 1.2 MG/DL — SIGNIFICANT CHANGE UP (ref 0.2–1.2)
BUN SERPL-MCNC: 10 MG/DL — SIGNIFICANT CHANGE UP (ref 7–23)
BUN SERPL-MCNC: 12 MG/DL — SIGNIFICANT CHANGE UP (ref 7–23)
BUN SERPL-MCNC: 7 MG/DL — SIGNIFICANT CHANGE UP (ref 7–23)
BUN SERPL-MCNC: 7 MG/DL — SIGNIFICANT CHANGE UP (ref 7–23)
CALCIUM SERPL-MCNC: 8.4 MG/DL — LOW (ref 8.5–10.1)
CALCIUM SERPL-MCNC: 8.4 MG/DL — LOW (ref 8.5–10.1)
CALCIUM SERPL-MCNC: 8.5 MG/DL — SIGNIFICANT CHANGE UP (ref 8.5–10.1)
CALCIUM SERPL-MCNC: 8.5 MG/DL — SIGNIFICANT CHANGE UP (ref 8.5–10.1)
CHLORIDE SERPL-SCNC: 82 MMOL/L — LOW (ref 96–108)
CHLORIDE SERPL-SCNC: 84 MMOL/L — LOW (ref 96–108)
CHLORIDE SERPL-SCNC: 86 MMOL/L — LOW (ref 96–108)
CHLORIDE SERPL-SCNC: 90 MMOL/L — LOW (ref 96–108)
CO2 SERPL-SCNC: 26 MMOL/L — SIGNIFICANT CHANGE UP (ref 22–31)
CO2 SERPL-SCNC: 31 MMOL/L — SIGNIFICANT CHANGE UP (ref 22–31)
CO2 SERPL-SCNC: 31 MMOL/L — SIGNIFICANT CHANGE UP (ref 22–31)
CO2 SERPL-SCNC: 32 MMOL/L — HIGH (ref 22–31)
CORTIS AM PEAK SERPL-MCNC: 25.6 UG/DL — HIGH (ref 6–18.4)
CREAT SERPL-MCNC: 0.53 MG/DL — SIGNIFICANT CHANGE UP (ref 0.5–1.3)
CREAT SERPL-MCNC: 0.56 MG/DL — SIGNIFICANT CHANGE UP (ref 0.5–1.3)
CREAT SERPL-MCNC: 0.57 MG/DL — SIGNIFICANT CHANGE UP (ref 0.5–1.3)
CREAT SERPL-MCNC: 0.7 MG/DL — SIGNIFICANT CHANGE UP (ref 0.5–1.3)
CRP SERPL-MCNC: 4 MG/L — SIGNIFICANT CHANGE UP
CULTURE RESULTS: NO GROWTH — SIGNIFICANT CHANGE UP
D DIMER BLD IA.RAPID-MCNC: 358 NG/ML DDU — HIGH
GLUCOSE SERPL-MCNC: 120 MG/DL — HIGH (ref 70–99)
GLUCOSE SERPL-MCNC: 126 MG/DL — HIGH (ref 70–99)
GLUCOSE SERPL-MCNC: 132 MG/DL — HIGH (ref 70–99)
GLUCOSE SERPL-MCNC: 99 MG/DL — SIGNIFICANT CHANGE UP (ref 70–99)
HCT VFR BLD CALC: 37.4 % — SIGNIFICANT CHANGE UP (ref 34.5–45)
HGB BLD-MCNC: 13.2 G/DL — SIGNIFICANT CHANGE UP (ref 11.5–15.5)
LDH SERPL L TO P-CCNC: 289 U/L — HIGH (ref 50–242)
MAGNESIUM SERPL-MCNC: 1.9 MG/DL — SIGNIFICANT CHANGE UP (ref 1.6–2.6)
MAGNESIUM SERPL-MCNC: 2.1 MG/DL — SIGNIFICANT CHANGE UP (ref 1.6–2.6)
MCHC RBC-ENTMCNC: 27.9 PG — SIGNIFICANT CHANGE UP (ref 27–34)
MCHC RBC-ENTMCNC: 35.3 GM/DL — SIGNIFICANT CHANGE UP (ref 32–36)
MCV RBC AUTO: 79.1 FL — LOW (ref 80–100)
NRBC # BLD: 0 /100 WBCS — SIGNIFICANT CHANGE UP (ref 0–0)
PHOSPHATE SERPL-MCNC: 2.7 MG/DL — SIGNIFICANT CHANGE UP (ref 2.5–4.5)
PHOSPHATE SERPL-MCNC: 2.7 MG/DL — SIGNIFICANT CHANGE UP (ref 2.5–4.5)
PLATELET # BLD AUTO: 258 K/UL — SIGNIFICANT CHANGE UP (ref 150–400)
POTASSIUM SERPL-MCNC: 3 MMOL/L — LOW (ref 3.5–5.3)
POTASSIUM SERPL-MCNC: 3.1 MMOL/L — LOW (ref 3.5–5.3)
POTASSIUM SERPL-MCNC: 3.2 MMOL/L — LOW (ref 3.5–5.3)
POTASSIUM SERPL-MCNC: 4.1 MMOL/L — SIGNIFICANT CHANGE UP (ref 3.5–5.3)
POTASSIUM SERPL-SCNC: 3 MMOL/L — LOW (ref 3.5–5.3)
POTASSIUM SERPL-SCNC: 3.1 MMOL/L — LOW (ref 3.5–5.3)
POTASSIUM SERPL-SCNC: 3.2 MMOL/L — LOW (ref 3.5–5.3)
POTASSIUM SERPL-SCNC: 4.1 MMOL/L — SIGNIFICANT CHANGE UP (ref 3.5–5.3)
PROT SERPL-MCNC: 7.2 GM/DL — SIGNIFICANT CHANGE UP (ref 6–8.3)
PROT SERPL-MCNC: 7.3 GM/DL — SIGNIFICANT CHANGE UP (ref 6–8.3)
RBC # BLD: 4.73 M/UL — SIGNIFICANT CHANGE UP (ref 3.8–5.2)
RBC # FLD: 11.9 % — SIGNIFICANT CHANGE UP (ref 10.3–14.5)
SODIUM SERPL-SCNC: 122 MMOL/L — LOW (ref 135–145)
SODIUM SERPL-SCNC: 124 MMOL/L — LOW (ref 135–145)
SODIUM SERPL-SCNC: 125 MMOL/L — LOW (ref 135–145)
SODIUM SERPL-SCNC: 125 MMOL/L — LOW (ref 135–145)
SPECIMEN SOURCE: SIGNIFICANT CHANGE UP
T3 SERPL-MCNC: 86 NG/DL — SIGNIFICANT CHANGE UP (ref 80–200)
T4 AB SER-ACNC: 7.7 UG/DL — SIGNIFICANT CHANGE UP (ref 4.6–12)
URATE SERPL-MCNC: 4.6 MG/DL — SIGNIFICANT CHANGE UP (ref 2.5–7)
WBC # BLD: 6.3 K/UL — SIGNIFICANT CHANGE UP (ref 3.8–10.5)
WBC # FLD AUTO: 6.3 K/UL — SIGNIFICANT CHANGE UP (ref 3.8–10.5)

## 2021-05-23 PROCEDURE — 99233 SBSQ HOSP IP/OBS HIGH 50: CPT | Mod: CS

## 2021-05-23 RX ORDER — BUMETANIDE 0.25 MG/ML
1 INJECTION INTRAMUSCULAR; INTRAVENOUS DAILY
Refills: 0 | Status: DISCONTINUED | OUTPATIENT
Start: 2021-05-23 | End: 2021-05-27

## 2021-05-23 RX ORDER — POTASSIUM CHLORIDE 20 MEQ
40 PACKET (EA) ORAL EVERY 4 HOURS
Refills: 0 | Status: COMPLETED | OUTPATIENT
Start: 2021-05-23 | End: 2021-05-23

## 2021-05-23 RX ORDER — POTASSIUM CHLORIDE 20 MEQ
10 PACKET (EA) ORAL
Refills: 0 | Status: COMPLETED | OUTPATIENT
Start: 2021-05-23 | End: 2021-05-23

## 2021-05-23 RX ORDER — SODIUM CHLORIDE 5 G/100ML
500 INJECTION, SOLUTION INTRAVENOUS
Refills: 0 | Status: DISCONTINUED | OUTPATIENT
Start: 2021-05-23 | End: 2021-05-23

## 2021-05-23 RX ADMIN — HEPARIN SODIUM 5000 UNIT(S): 5000 INJECTION INTRAVENOUS; SUBCUTANEOUS at 06:18

## 2021-05-23 RX ADMIN — SODIUM CHLORIDE 40 MILLILITER(S): 5 INJECTION, SOLUTION INTRAVENOUS at 02:12

## 2021-05-23 RX ADMIN — DORZOLAMIDE HYDROCHLORIDE 1 DROP(S): 20 SOLUTION/ DROPS OPHTHALMIC at 22:03

## 2021-05-23 RX ADMIN — Medication 0.25 MILLIGRAM(S): at 02:12

## 2021-05-23 RX ADMIN — AMLODIPINE BESYLATE 10 MILLIGRAM(S): 2.5 TABLET ORAL at 20:01

## 2021-05-23 RX ADMIN — DORZOLAMIDE HYDROCHLORIDE 1 DROP(S): 20 SOLUTION/ DROPS OPHTHALMIC at 13:31

## 2021-05-23 RX ADMIN — DORZOLAMIDE HYDROCHLORIDE 1 DROP(S): 20 SOLUTION/ DROPS OPHTHALMIC at 05:24

## 2021-05-23 RX ADMIN — Medication 100 MILLIEQUIVALENT(S): at 11:52

## 2021-05-23 RX ADMIN — Medication 100 MILLIEQUIVALENT(S): at 13:30

## 2021-05-23 RX ADMIN — SODIUM CHLORIDE 30 MILLILITER(S): 5 INJECTION, SOLUTION INTRAVENOUS at 11:03

## 2021-05-23 RX ADMIN — BUMETANIDE 1 MILLIGRAM(S): 0.25 INJECTION INTRAMUSCULAR; INTRAVENOUS at 22:03

## 2021-05-23 RX ADMIN — Medication 40 MILLIEQUIVALENT(S): at 20:01

## 2021-05-23 RX ADMIN — BUDESONIDE AND FORMOTEROL FUMARATE DIHYDRATE 2 PUFF(S): 160; 4.5 AEROSOL RESPIRATORY (INHALATION) at 18:09

## 2021-05-23 RX ADMIN — Medication 100 MILLIEQUIVALENT(S): at 10:58

## 2021-05-23 RX ADMIN — HEPARIN SODIUM 5000 UNIT(S): 5000 INJECTION INTRAVENOUS; SUBCUTANEOUS at 17:30

## 2021-05-23 RX ADMIN — Medication 40 MILLIEQUIVALENT(S): at 23:11

## 2021-05-23 RX ADMIN — CHLORHEXIDINE GLUCONATE 1 APPLICATION(S): 213 SOLUTION TOPICAL at 05:24

## 2021-05-23 NOTE — CONSULT NOTE ADULT - SUBJECTIVE AND OBJECTIVE BOX
HPI:  79 YO female with PMH HTN on amlodipine, COPD on symbicort, glaucoma, BIBEMS after reported seizure noted by family this am. Per daughter, pt's eyes rolled back into her head - pt was blue, so daughter started CPR and mouth to mouth for a few seconds before pt came to.  In the ED, pt was post-ictal - CT head negative, and then pt woke up some - now currently alert, oriented x3, but still slightly lethargic.  Labs significant for sodium of 120, potassium 2.4, also COVID-19 positive. (Per daughter had a positive contact about 2 weeks ago, also with URI-like symptoms at home over this last week or so).  Pt received 100cc bolus of 3% NS.   Will admit to ICU for severe symptomatic hyponatremia and further monitoring. (22 May 2021 12:20)      PAST MEDICAL & SURGICAL HISTORY:  Essential hypertension    Asthma, unspecified asthma severity, unspecified whether complicated, unspecified whether persistent        REVIEW OF SYSTEMS:    CONSTITUTIONAL: No fever, weight loss, or fatigue  EYES: No eye pain, visual disturbances, or discharge  ENMT:  No difficulty hearing, tinnitus, vertigo; No sinus or throat pain  NECK: No pain or stiffness  BREASTS: No pain, masses, or nipple discharge  RESPIRATORY: No cough, wheezing, chills or hemoptysis; No shortness of breath  CARDIOVASCULAR: No chest pain, palpitations, dizziness, or leg swelling  GASTROINTESTINAL: No abdominal or epigastric pain. No nausea, vomiting, or hematemesis; No diarrhea or constipation. No melena or hematochezia.  GENITOURINARY: No dysuria, frequency, hematuria, or incontinence  NEUROLOGICAL: No headaches, memory loss, loss of strength, numbness, or tremors  SKIN: No itching, burning, rashes, or lesions   LYMPH NODES: No enlarged glands  ENDOCRINE: No heat or cold intolerance; No hair loss  MUSCULOSKELETAL: No joint pain or swelling; No muscle, back, or extremity pain  PSYCHIATRIC: No depression, anxiety, mood swings, or difficulty sleeping  HEME/LYMPH: No easy bruising, or bleeding gums  ALLERY AND IMMUNOLOGIC: No hives or eczema      MEDICATIONS  (STANDING):  amLODIPine   Tablet 10 milliGRAM(s) Oral daily  budesonide 160 MICROgram(s)/formoterol 4.5 MICROgram(s) Inhaler 2 Puff(s) Inhalation two times a day  chlorhexidine 2% Cloths 1 Application(s) Topical <User Schedule>  dorzolamide 2% Ophthalmic Solution 1 Drop(s) Both EYES three times a day  heparin   Injectable 5000 Unit(s) SubCutaneous every 12 hours  potassium chloride  10 mEq/100 mL IVPB 10 milliEquivalent(s) IV Intermittent every 1 hour    MEDICATIONS  (PRN):  ALBUTerol    90 MICROgram(s) HFA Inhaler 2 Puff(s) Inhalation every 6 hours PRN Shortness of Breath and/or Wheezing      Allergies    No Known Allergies    Intolerances        SOCIAL HISTORY:    FAMILY HISTORY:      Vital Signs Last 24 Hrs  T(C): 37.1 (23 May 2021 08:00), Max: 37.2 (23 May 2021 00:00)  T(F): 98.8 (23 May 2021 08:00), Max: 98.9 (23 May 2021 00:00)  HR: 66 (23 May 2021 11:00) (59 - 87)  BP: 135/70 (23 May 2021 11:00) (118/61 - 184/71)  BP(mean): 87 (23 May 2021 11:00) (74 - 123)  RR: 17 (23 May 2021 11:00) (12 - 24)  SpO2: 94% (23 May 2021 11:00) (92% - 100%)    PHYSICAL EXAM:    GENERAL: NAD, well-groomed, well-developed  HEAD:  Atraumatic, Normocephalic  EYES: EOMI, PERRLA, conjunctiva and sclera clear  ENMT: No tonsillar erythema, exudates, or enlargement; Moist mucous membranes, Good dentition, No lesions  NECK: Supple, No JVD, Normal thyroid  NERVOUS SYSTEM:  Alert & Oriented X3, Good concentration; Motor Strength 5/5 B/L upper and lower extremities; DTRs 2+ intact and symmetric  CHEST/LUNG: Clear to percussion bilaterally; No rales, rhonchi, wheezing, or rubs  HEART: Regular rate and rhythm; No murmurs, rubs, or gallops  ABDOMEN: Soft, Nontender, Nondistended; Bowel sounds present  EXTREMITIES:  2+ Peripheral Pulses, No clubbing, cyanosis, or edema  LYMPH: No lymphadenopathy noted  SKIN: No rashes or lesions      LABS:                        13.2   6.30  )-----------( 258      ( 23 May 2021 06:52 )             37.4         124<L>  |  84<L>  |  7   ----------------------------<  99  3.1<L>   |  32<H>  |  0.56    Ca    8.5      23 May 2021 06:52  Phos  2.7       Mg     2.1         TPro  7.2  /  Alb  3.6  /  TBili  1.2  /  DBili  x   /  AST  35  /  ALT  29  /  AlkPhos  96        Urinalysis Basic - ( 22 May 2021 11:01 )    Color: Yellow / Appearance: Clear / S.005 / pH: x  Gluc: x / Ketone: Trace  / Bili: Negative / Urobili: Negative mg/dL   Blood: x / Protein: 30 mg/dL / Nitrite: Negative   Leuk Esterase: Negative / RBC: 0-2 /HPF / WBC 0-2   Sq Epi: x / Non Sq Epi: x / Bacteria: Few        Culture - Urine (collected 22 May 2021 15:11)  Source: .Urine Clean Catch (Midstream)  Final Report (23 May 2021 10:39):    No growth      CARDIAC MARKERS ( 22 May 2021 08:19 )  <.015 ng/mL / x     / x     / x     / x          RADIOLOGY & ADDITIONAL STUDIES:    ad< from: Xray Chest 1 View- PORTABLE-Urgent (21 @ 09:16) >    EXAM:  XR CHEST PORTABLE URGENT 1V                            PROCEDURE DATE:  2021          INTERPRETATION:  Clinical Information: Chest pain    Technique: AP chest image.    Comparison: None    Findings/  Impression: The heart is unremarkable. The lungs are clear. Bones are unremarkable for age.    < end of copied text >  rd< from: CT Head No Cont (21 @ 08:55) >    EXAM:  CT BRAIN                            PROCEDURE DATE:  2021          INTERPRETATION:  INDICATION:  Status post trauma with head injury.   Headache. Seizure.  TECHNIQUE:  A non contrast 2.5mm axial CT study of the brain was performed fromskull base to vertex. Coronal and sagittal reformations were generated from the axial data.  COMPARISON EXAMINATION:  no prior.    FINDINGS:    HEMISPHERES:There are scattered small vessel ischemic changes in both hemispheres in the basal ganglia andwhite matter. There is no CT evidence of an acute infarct, hemorrhage, or space occupying lesion.  VENTRICLES:  Midline and normal in size.  POSTERIOR FOSSA:  Tortuosity of the vertebrobasilar system is noted, with extrinsic deformity on the ventral dolores on the right. This is of uncertain if any clinical significance.  EXTRACEREBRAL SPACES:  No subdural or epidural collections are noted.  SKULL BASE AND CALVARIUM:  Appears intact.  No fracture or destructive lesion is identified.  SINUSES AND MASTOIDS:  Clear.  MISCELLANEOUS:  No orbital or suprasellar abnormality noted.    IMPRESSION:    1)  mild scattered chronic ischemic changes, with no CT evidence of an acute infarct, hemorrhage, or space occupying lesion..  2)  follow-up MR imaging may be considered for further assessment.    < end of copied text >

## 2021-05-23 NOTE — PROGRESS NOTE ADULT - SUBJECTIVE AND OBJECTIVE BOX
HPI:  81 YO female with PMH HTN on amlodipine, COPD on symbicort, glaucoma, BIBEMS after reported seizure noted by family this am. Per daughter, pt's eyes rolled back into her head - pt was blue, so daughter started CPR and mouth to mouth for a few seconds before pt came to.  In the ED, pt was post-ictal - CT head negative, and then pt woke up some - now currently alert, oriented x3, but still slightly lethargic.  Labs significant for sodium of 120, potassium 2.4, also COVID-19 positive. (Per daughter had a positive contact about 2 weeks ago, also with URI-like symptoms at home over this last week or so).  Pt received 100cc bolus of 3% NS.   Will admit to ICU for severe symptomatic hyponatremia and further monitoring. (22 May 2021 12:20)      24 hr events:  pt alert and calm, however overnight tried to get out of bed and needed intervention    ROS  +hunger  denies CP  denies HA  denies SOB  denies cough      ## Labs:  CBC:                        13.2   6.30  )-----------( 258      ( 23 May 2021 06:52 )             37.4     Chem:      124<L>  |  84<L>  |  7   ----------------------------<  99  3.1<L>   |  32<H>  |  0.56    Ca    8.5      23 May 2021 06:52  Phos  2.7       Mg     2.1         TPro  7.2  /  Alb  3.6  /  TBili  1.2  /  DBili  x   /  AST  35  /  ALT  29  /  AlkPhos  96      Coags:    culture blood:  --   @ 15:11            culture sputum:     No growth           culture urine:  --  @ 15:11             ## Medications:    amLODIPine   Tablet 10 milliGRAM(s) Oral daily    ALBUTerol    90 MICROgram(s) HFA Inhaler 2 Puff(s) Inhalation every 6 hours PRN  budesonide 160 MICROgram(s)/formoterol 4.5 MICROgram(s) Inhaler 2 Puff(s) Inhalation two times a day      heparin   Injectable 5000 Unit(s) SubCutaneous every 12 hours          ## Vitals:  T(C): 36.8 (21 @ 12:04), Max: 37.2 (21 @ 00:00)  HR: 68 (21 @ 12:00) (59 - 87)  BP: 135/70 (21 @ 11:00) (118/61 - 179/96)  BP(mean): 87 (21 @ 11:00) (74 - 123)  RR: 16 (21 @ 12:00) (12 - 24)  SpO2: 96% (21 @ 12:00) (92% - 100%)  Wt(kg): --  Vent:   AB-22 @ 07:01  -   @ 07:00  --------------------------------------------------------  IN: 1240 mL / OUT: 2200 mL / NET: -960 mL     @ 07:01  -   @ 12:25  --------------------------------------------------------  IN: 250 mL / OUT: 175 mL / NET: 75 mL          ## P/E:  Gen: lying comfortably in bed in no apparent distress  Mouth: mmm  Neck: no LAD  Lungs: b/l ae, no crackles  Heart: s1s2 reg no murmur  Abd: +BS soft nontender  Ext: no edema  Neuro: aox3, moving all extremities         < from: Xray Chest 1 View- PORTABLE-Urgent (21 @ 09:16) >    EXAM:  XR CHEST PORTABLE URGENT 1V                            PROCEDURE DATE:  2021          INTERPRETATION:  Clinical Information: Chest pain    Technique: AP chest image.    Comparison: None    Findings/  Impression: The heart is unremarkable. The lungs are clear. Bones are unremarkable for age.            YENI WARREN MD; Attending Interventional Radiologist  This document has been electronically signed. May 22 2021  9:45AM    < end of copied text >

## 2021-05-23 NOTE — PROGRESS NOTE ADULT - SUBJECTIVE AND OBJECTIVE BOX
[facility] NEPHROLOGY SERVICES, [facility]  NEPHROLOGY AND HYPERTENSION  [address]  [address]  [place] [address]  [phone]    MD BERNABE PEÑALOZA, MD CAROLINE MAST, MD SHIRA CHEN, MD CHRISTOPHER AHN, MD JED BURNS MD          Patient events noted  no distress    MEDICATIONS  (STANDING):  amLODIPine   Tablet 10 milliGRAM(s) Oral daily  budesonide 160 MICROgram(s)/formoterol 4.5 MICROgram(s) Inhaler 2 Puff(s) Inhalation two times a day  buMETAnide 1 milliGRAM(s) Oral daily  chlorhexidine 2% Cloths 1 Application(s) Topical <User Schedule>  dorzolamide 2% Ophthalmic Solution 1 Drop(s) Both EYES three times a day  heparin   Injectable 5000 Unit(s) SubCutaneous every 12 hours  potassium chloride   Powder 40 milliEquivalent(s) Oral every 4 hours    MEDICATIONS  (PRN):  ALBUTerol    90 MICROgram(s) HFA Inhaler 2 Puff(s) Inhalation every 6 hours PRN Shortness of Breath and/or Wheezing      05-22-21 @ 07:01  -  05-23-21 @ 07:00  --------------------------------------------------------  IN: 1240 mL / OUT: 2200 mL / NET: -960 mL    05-23-21 @ 07:01  -  05-23-21 @ 23:06  --------------------------------------------------------  IN: 610 mL / OUT: 655 mL / NET: -45 mL      PHYSICAL EXAM:      T(C): 37.5 (05-23-21 @ 20:00), Max: 37.5 (05-23-21 @ 20:00)  HR: 66 (05-23-21 @ 22:00) (59 - 79)  BP: 146/78 (05-23-21 @ 22:00) (130/101 - 165/82)  RR: 18 (05-23-21 @ 22:00) (12 - 24)  SpO2: 92% (05-23-21 @ 22:00) (91% - 100%)  Wt(kg): --  Lungs clear  Heart S1S2  Abd soft NT ND  Extremities:   tr edema                                    13.2   6.30  )-----------( 258      ( 23 May 2021 06:52 )             37.4     05-23    125<L>  |  86<L>  |  12  ----------------------------<  132<H>  3.0<L>   |  31  |  0.70    Ca    8.5      23 May 2021 17:46  Phos  2.7     05-23  Mg     2.1     05-23    TPro  7.2  /  Alb  3.6  /  TBili  1.2  /  DBili  x   /  AST  35  /  ALT  29  /  AlkPhos  96  05-23      Sodium, Random Urine: 130: Reference Ranges have NOT been established for random urine analytes due  to variability in fluid intake and concentration. mmol/L (05.22.21 @ 15:13)        LIVER FUNCTIONS - ( 23 May 2021 06:52 )  Alb: 3.6 g/dL / Pro: 7.2 gm/dL / ALK PHOS: 96 U/L / ALT: 29 U/L / AST: 35 U/L / GGT: x           Creatinine Trend: 0.70<--, 0.53<--, 0.56<--, 0.57<--, 0.42<--, 0.56<--      Assessment   Hyponatremia; combination of avid water intake; SIADH; poor solute intake    Plan:  Hypertonic saline  Loop direutic added   Will follow.    German Goldstein MD

## 2021-05-24 LAB
ALBUMIN SERPL ELPH-MCNC: 3.4 G/DL — SIGNIFICANT CHANGE UP (ref 3.3–5)
ALP SERPL-CCNC: 86 U/L — SIGNIFICANT CHANGE UP (ref 40–120)
ALT FLD-CCNC: 46 U/L — SIGNIFICANT CHANGE UP (ref 12–78)
ANION GAP SERPL CALC-SCNC: 5 MMOL/L — SIGNIFICANT CHANGE UP (ref 5–17)
AST SERPL-CCNC: 48 U/L — HIGH (ref 15–37)
BASOPHILS # BLD AUTO: 0.01 K/UL — SIGNIFICANT CHANGE UP (ref 0–0.2)
BASOPHILS NFR BLD AUTO: 0.2 % — SIGNIFICANT CHANGE UP (ref 0–2)
BILIRUB SERPL-MCNC: 1.1 MG/DL — SIGNIFICANT CHANGE UP (ref 0.2–1.2)
BUN SERPL-MCNC: 10 MG/DL — SIGNIFICANT CHANGE UP (ref 7–23)
CALCIUM SERPL-MCNC: 8.3 MG/DL — LOW (ref 8.5–10.1)
CHLORIDE SERPL-SCNC: 88 MMOL/L — LOW (ref 96–108)
CO2 SERPL-SCNC: 35 MMOL/L — HIGH (ref 22–31)
CREAT SERPL-MCNC: 0.58 MG/DL — SIGNIFICANT CHANGE UP (ref 0.5–1.3)
EOSINOPHIL # BLD AUTO: 0 K/UL — SIGNIFICANT CHANGE UP (ref 0–0.5)
EOSINOPHIL NFR BLD AUTO: 0 % — SIGNIFICANT CHANGE UP (ref 0–6)
GLUCOSE SERPL-MCNC: 98 MG/DL — SIGNIFICANT CHANGE UP (ref 70–99)
HCT VFR BLD CALC: 38.7 % — SIGNIFICANT CHANGE UP (ref 34.5–45)
HGB BLD-MCNC: 13.4 G/DL — SIGNIFICANT CHANGE UP (ref 11.5–15.5)
IMM GRANULOCYTES NFR BLD AUTO: 0.5 % — SIGNIFICANT CHANGE UP (ref 0–1.5)
LYMPHOCYTES # BLD AUTO: 1.08 K/UL — SIGNIFICANT CHANGE UP (ref 1–3.3)
LYMPHOCYTES # BLD AUTO: 16.3 % — SIGNIFICANT CHANGE UP (ref 13–44)
MAGNESIUM SERPL-MCNC: 2.1 MG/DL — SIGNIFICANT CHANGE UP (ref 1.6–2.6)
MCHC RBC-ENTMCNC: 27.9 PG — SIGNIFICANT CHANGE UP (ref 27–34)
MCHC RBC-ENTMCNC: 34.6 GM/DL — SIGNIFICANT CHANGE UP (ref 32–36)
MCV RBC AUTO: 80.5 FL — SIGNIFICANT CHANGE UP (ref 80–100)
MONOCYTES # BLD AUTO: 1.03 K/UL — HIGH (ref 0–0.9)
MONOCYTES NFR BLD AUTO: 15.5 % — HIGH (ref 2–14)
NEUTROPHILS # BLD AUTO: 4.49 K/UL — SIGNIFICANT CHANGE UP (ref 1.8–7.4)
NEUTROPHILS NFR BLD AUTO: 67.5 % — SIGNIFICANT CHANGE UP (ref 43–77)
NRBC # BLD: 0 /100 WBCS — SIGNIFICANT CHANGE UP (ref 0–0)
PHOSPHATE SERPL-MCNC: 2 MG/DL — LOW (ref 2.5–4.5)
PLATELET # BLD AUTO: 272 K/UL — SIGNIFICANT CHANGE UP (ref 150–400)
POTASSIUM SERPL-MCNC: 3.1 MMOL/L — LOW (ref 3.5–5.3)
POTASSIUM SERPL-SCNC: 3.1 MMOL/L — LOW (ref 3.5–5.3)
PROT SERPL-MCNC: 6.7 GM/DL — SIGNIFICANT CHANGE UP (ref 6–8.3)
RAPID RVP RESULT: DETECTED
RBC # BLD: 4.81 M/UL — SIGNIFICANT CHANGE UP (ref 3.8–5.2)
RBC # FLD: 12.2 % — SIGNIFICANT CHANGE UP (ref 10.3–14.5)
SARS-COV-2 RNA SPEC QL NAA+PROBE: DETECTED
SODIUM SERPL-SCNC: 128 MMOL/L — LOW (ref 135–145)
WBC # BLD: 6.64 K/UL — SIGNIFICANT CHANGE UP (ref 3.8–10.5)
WBC # FLD AUTO: 6.64 K/UL — SIGNIFICANT CHANGE UP (ref 3.8–10.5)

## 2021-05-24 PROCEDURE — 99233 SBSQ HOSP IP/OBS HIGH 50: CPT | Mod: CS

## 2021-05-24 RX ORDER — POTASSIUM CHLORIDE 20 MEQ
40 PACKET (EA) ORAL EVERY 4 HOURS
Refills: 0 | Status: COMPLETED | OUTPATIENT
Start: 2021-05-24 | End: 2021-05-24

## 2021-05-24 RX ORDER — LIDOCAINE 4 G/100G
1 CREAM TOPICAL DAILY
Refills: 0 | Status: DISCONTINUED | OUTPATIENT
Start: 2021-05-24 | End: 2021-05-27

## 2021-05-24 RX ORDER — ACETAMINOPHEN 500 MG
650 TABLET ORAL ONCE
Refills: 0 | Status: COMPLETED | OUTPATIENT
Start: 2021-05-24 | End: 2021-05-24

## 2021-05-24 RX ADMIN — BUDESONIDE AND FORMOTEROL FUMARATE DIHYDRATE 2 PUFF(S): 160; 4.5 AEROSOL RESPIRATORY (INHALATION) at 17:22

## 2021-05-24 RX ADMIN — HEPARIN SODIUM 5000 UNIT(S): 5000 INJECTION INTRAVENOUS; SUBCUTANEOUS at 17:22

## 2021-05-24 RX ADMIN — Medication 62.5 MILLIMOLE(S): at 06:25

## 2021-05-24 RX ADMIN — LIDOCAINE 1 PATCH: 4 CREAM TOPICAL at 19:55

## 2021-05-24 RX ADMIN — HEPARIN SODIUM 5000 UNIT(S): 5000 INJECTION INTRAVENOUS; SUBCUTANEOUS at 06:24

## 2021-05-24 RX ADMIN — CHLORHEXIDINE GLUCONATE 1 APPLICATION(S): 213 SOLUTION TOPICAL at 06:25

## 2021-05-24 RX ADMIN — Medication 40 MILLIEQUIVALENT(S): at 06:24

## 2021-05-24 RX ADMIN — LIDOCAINE 1 PATCH: 4 CREAM TOPICAL at 14:23

## 2021-05-24 RX ADMIN — BUMETANIDE 1 MILLIGRAM(S): 0.25 INJECTION INTRAMUSCULAR; INTRAVENOUS at 06:25

## 2021-05-24 RX ADMIN — BUDESONIDE AND FORMOTEROL FUMARATE DIHYDRATE 2 PUFF(S): 160; 4.5 AEROSOL RESPIRATORY (INHALATION) at 06:26

## 2021-05-24 RX ADMIN — Medication 650 MILLIGRAM(S): at 22:17

## 2021-05-24 RX ADMIN — AMLODIPINE BESYLATE 10 MILLIGRAM(S): 2.5 TABLET ORAL at 17:43

## 2021-05-24 RX ADMIN — DORZOLAMIDE HYDROCHLORIDE 1 DROP(S): 20 SOLUTION/ DROPS OPHTHALMIC at 14:23

## 2021-05-24 RX ADMIN — Medication 650 MILLIGRAM(S): at 23:07

## 2021-05-24 RX ADMIN — DORZOLAMIDE HYDROCHLORIDE 1 DROP(S): 20 SOLUTION/ DROPS OPHTHALMIC at 21:06

## 2021-05-24 RX ADMIN — DORZOLAMIDE HYDROCHLORIDE 1 DROP(S): 20 SOLUTION/ DROPS OPHTHALMIC at 06:26

## 2021-05-24 RX ADMIN — Medication 40 MILLIEQUIVALENT(S): at 11:02

## 2021-05-24 NOTE — PROGRESS NOTE ADULT - SUBJECTIVE AND OBJECTIVE BOX
[facility] NEPHROLOGY SERVICES, [facility]  NEPHROLOGY AND HYPERTENSION  [address]  [address]  [place] [address]  [phone]    MD BERNABE PEÑALOZA, MD CAROLINE MAST, MD SHIRA CHEN, MD CHRISTOPHER AHN, MD JED BURNS MD          Patient events noted    MEDICATIONS  (STANDING):  amLODIPine   Tablet 10 milliGRAM(s) Oral daily  budesonide 160 MICROgram(s)/formoterol 4.5 MICROgram(s) Inhaler 2 Puff(s) Inhalation two times a day  buMETAnide 1 milliGRAM(s) Oral daily  chlorhexidine 2% Cloths 1 Application(s) Topical <User Schedule>  dorzolamide 2% Ophthalmic Solution 1 Drop(s) Both EYES three times a day  heparin   Injectable 5000 Unit(s) SubCutaneous every 12 hours  lidocaine   Patch 1 Patch Transdermal daily    MEDICATIONS  (PRN):  ALBUTerol    90 MICROgram(s) HFA Inhaler 2 Puff(s) Inhalation every 6 hours PRN Shortness of Breath and/or Wheezing      05-23-21 @ 07:01  -  05-24-21 @ 07:00  --------------------------------------------------------  IN: 960 mL / OUT: 2035 mL / NET: -1075 mL    05-24-21 @ 07:01  -  05-24-21 @ 18:36  --------------------------------------------------------  IN: 600 mL / OUT: 750 mL / NET: -150 mL      PHYSICAL EXAM:      T(C): 36.4 (05-24-21 @ 15:00), Max: 37.5 (05-23-21 @ 20:00)  HR: 78 (05-24-21 @ 17:32) (62 - 90)  BP: 115/77 (05-24-21 @ 17:32) (102/67 - 160/85)  RR: 14 (05-24-21 @ 17:32) (14 - 21)  SpO2: 100% (05-24-21 @ 17:32) (91% - 100%)  Wt(kg): --  Lungs mild rhonchi  Heart S1S2  Abd soft NT ND  Extremities:   tr edema                                    13.4   6.64  )-----------( 272      ( 24 May 2021 05:11 )             38.7     05-24    128<L>  |  88<L>  |  10  ----------------------------<  98  3.1<L>   |  35<H>  |  0.58    Ca    8.3<L>      24 May 2021 05:10  Phos  2.0     05-24  Mg     2.1     05-24    TPro  6.7  /  Alb  3.4  /  TBili  1.1  /  DBili  x   /  AST  48<H>  /  ALT  46  /  AlkPhos  86  05-24      LIVER FUNCTIONS - ( 24 May 2021 05:10 )  Alb: 3.4 g/dL / Pro: 6.7 gm/dL / ALK PHOS: 86 U/L / ALT: 46 U/L / AST: 48 U/L / GGT: x           Creatinine Trend: 0.58<--, 0.70<--, 0.53<--, 0.56<--, 0.57<--, 0.42<--      Assessment   Hyponatremia; combination of avid water intake; SIADH; poor solute intake  Improving   Plan:    Loop direutic added   FR  Replete K and Phos  Will follow.      German Goldstein MD

## 2021-05-24 NOTE — PROGRESS NOTE ADULT - SUBJECTIVE AND OBJECTIVE BOX
INTERVAL HPI/OVERNIGHT EVENTS:    Sodium improving, no further seizures.  No overnight events; on room air.     Sodium trend: 128 mmol/L (21 @ 05:10)  125 mmol/L (21 @ 17:46)  125 mmol/L (21 @ 12:41)  124 mmol/L (21 @ 06:52)  122 mmol/L (21 @ 01:10)  120 mmol/L (21 @ 19:17)  120 mmol/L (21 @ 12:25)  120 mmol/L (21 @ 08:19)    CENTRAL LINE: [ ] YES [ x] NO  LOCATION:       GLASGOW: [ x] YES [ ] NO        A-LINE:  [ ] YES [x ] NO  LOCATION:       GLOBAL ISSUE/BEST PRACTICE:  Analgesia:   Sedation:  HOB elevation: yes  Stress ulcer prophylaxis:   VTE prophylaxis: HSQ  Oral Care: Chlorhexidine  Glycemic control:   Nutrition: Diet, Regular: Lacto-Ovo Veg (Accepts Milk Prod., Eggs) (21 @ 11:15) [Active]    REVIEW OF SYSTEMS:   CONSTITUTIONAL: No fever, weight loss, or fatigue  EYES: No eye pain, visual disturbances, or discharge  ENMT:  No difficulty hearing, tinnitus, vertigo; No sinus or throat pain  NECK: No pain or stiffness  RESPIRATORY: No cough, wheezing, chills or hemoptysis; No shortness of breath  CARDIOVASCULAR: No chest pain, palpitations, dizziness, or leg swelling  GASTROINTESTINAL: No abdominal or epigastric pain. No nausea, vomiting, or hematemesis; No diarrhea or constipation. No melena or hematochezia.  GENITOURINARY: No dysuria, frequency, hematuria, or incontinence  NEUROLOGICAL: No headaches, memory loss, loss of strength, numbness, or tremors  SKIN: No itching, burning, rashes, or lesions     PHYSICAL EXAM:    GENERAL: NAD, well-groomed, well-developed  HEAD:  Atraumatic, Normocephalic  EYES: EOMI, PERRLA, conjunctiva and sclera clear  NECK: Supple, No JVD, Normal thyroid  CHEST/LUNG: Clear to auscultation bilaterally; No rales, rhonchi, wheezing, or rubs  HEART: Regular rate and rhythm; No murmurs, rubs, or gallops  ABDOMEN: Soft, Nontender, Nondistended; Bowel sounds present  EXTREMITIES:  2+ Peripheral Pulses, No clubbing, cyanosis, or edema  SKIN: No rashes or lesions  NERVOUS SYSTEM:  Alert & Oriented X3, Good concentration; Motor Strength 5/5 B/L upper and lower extremities; DTRs 2+ intact and symmetric    ICU Vital Signs Last 24 Hrs  T(C): 36.8 (24 May 2021 07:10), Max: 37.5 (23 May 2021 20:00)  T(F): 98.2 (24 May 2021 07:10), Max: 99.5 (23 May 2021 20:00)  HR: 75 (24 May 2021 08:00) (62 - 90)  BP: 122/81 (24 May 2021 08:00) (118/75 - 160/85)  BP(mean): 92 (24 May 2021 08:00) (84 - 109)  ABP: --  ABP(mean): --  RR: 15 (24 May 2021 08:00) (12 - 21)  SpO2: 94% (24 May 2021 08:00) (91% - 97%)    I&O's Detail    23 May 2021 07:01  -  24 May 2021 07:00  --------------------------------------------------------  IN:    IV PiggyBack: 250 mL    Oral Fluid: 620 mL    sodium chloride 3%: 90 mL  Total IN: 960 mL    OUT:    Indwelling Catheter - Urethral (mL): 2035 mL  Total OUT: 2035 mL    Total NET: -1075 mL      24 May 2021 07:01  -  24 May 2021 08:45  --------------------------------------------------------  IN:  Total IN: 0 mL    OUT:    Indwelling Catheter - Urethral (mL): 100 mL  Total OUT: 100 mL    Total NET: -100 mL    MEDICATIONS  NEURO  Meds:   RESPIRATORY    Meds: ALBUTerol    90 MICROgram(s) HFA Inhaler 2 Puff(s) Inhalation every 6 hours PRN Shortness of Breath and/or Wheezing  budesonide 160 MICROgram(s)/formoterol 4.5 MICROgram(s) Inhaler 2 Puff(s) Inhalation two times a day    CARDIOVASCULAR  Meds: amLODIPine   Tablet 10 milliGRAM(s) Oral daily  buMETAnide 1 milliGRAM(s) Oral daily    GI/NUTRITION  Meds:   GENITOURINARY  Meds: potassium chloride    Tablet ER 40 milliEquivalent(s) Oral every 4 hours    HEMATOLOGIC  Meds: heparin   Injectable 5000 Unit(s) SubCutaneous every 12 hours    [x] VTE Prophylaxis  INFECTIOUS DISEASES  Meds:   ENDOCRINE  CAPILLARY BLOOD GLUCOSE        Meds:   OTHER MEDICATIONS:  chlorhexidine 2% Cloths 1 Application(s) Topical <User Schedule>  dorzolamide 2% Ophthalmic Solution 1 Drop(s) Both EYES three times a day  :    LABS:                        13.4   6.64  )-----------( 272      ( 24 May 2021 05:11 )             38.7      05-    128<L>  |  88<L>  |  10  ----------------------------<  98  3.1<L>   |  35<H>  |  0.58    Ca    8.3<L>      24 May 2021 05:10  Phos  2.0       Mg     2.1         TPro  6.7  /  Alb  3.4  /  TBili  1.1  /  DBili  x   /  AST  48<H>  /  ALT  46  /  AlkPhos  86        Urinalysis Basic - ( 22 May 2021 11:01 )    Color: Yellow / Appearance: Clear / S.005 / pH: x  Gluc: x / Ketone: Trace  / Bili: Negative / Urobili: Negative mg/dL   Blood: x / Protein: 30 mg/dL / Nitrite: Negative   Leuk Esterase: Negative / RBC: 0-2 /HPF / WBC 0-2   Sq Epi: x / Non Sq Epi: x / Bacteria: Few      Culture Results:   No growth ( @ 15:11)    Flu With COVID-19 By ROSAS (21 @ 08:19)   SARS-CoV-2 Result: Detected: EUA/IVD   This Respiratory Panel uses polymerase chain reaction (PCR) to detect for   influenza A; influenza B; and SARS-CoV-2.   This test was validated by [facility] and is in use under the FDA   Emergency Use Authorization (EUA) for clinical labs CLIA-certified to   perform high complexity testing. Test results should be correlated with   clinical presentation, patient history, and epidemiology.   Influenza A Result: NotDetec: EUA/IVD   Influenza B Result: NotDetec: EUA/IVD     RADIOLOGY & ADDITIONAL STUDIES:     INTERVAL HPI/OVERNIGHT EVENTS:    Sodium improving, no further seizures.  No overnight events; on room air.      [id number]    Sodium trend: 128 mmol/L (21 @ 05:10)  125 mmol/L (21 @ 17:46)  125 mmol/L (21 @ 12:41)  124 mmol/L (21 @ 06:52)  122 mmol/L (21 @ 01:10)  120 mmol/L (21 @ 19:17)  120 mmol/L (21 @ 12:25)  120 mmol/L (21 @ 08:19)    CENTRAL LINE: [ ] YES [ x] NO  LOCATION:       GLASGOW: [ x] YES [ ] NO        A-LINE:  [ ] YES [x ] NO  LOCATION:       GLOBAL ISSUE/BEST PRACTICE:  Analgesia:   Sedation:  HOB elevation: yes  Stress ulcer prophylaxis:   VTE prophylaxis: HSQ  Oral Care: Chlorhexidine  Glycemic control:   Nutrition: Diet, Regular: Lacto-Ovo Veg (Accepts Milk Prod., Eggs) (21 @ 11:15) [Active]    REVIEW OF SYSTEMS:  + back pain  CONSTITUTIONAL: No fever, weight loss, or fatigue  EYES: No eye pain, visual disturbances, or discharge  ENMT:  No difficulty hearing, tinnitus, vertigo; No sinus or throat pain  NECK: No pain or stiffness  RESPIRATORY: No cough, wheezing, chills or hemoptysis; No shortness of breath  CARDIOVASCULAR: No chest pain, palpitations, dizziness, or leg swelling  GASTROINTESTINAL: No abdominal or epigastric pain. No nausea, vomiting, or hematemesis; No diarrhea or constipation. No melena or hematochezia.  GENITOURINARY: No dysuria, frequency, hematuria, or incontinence  NEUROLOGICAL: No headaches, memory loss, loss of strength, numbness, or tremors  SKIN: No itching, burning, rashes, or lesions     PHYSICAL EXAM:    GENERAL: NAD, well-groomed, well-developed  EYES: EOMI, PERRLA, conjunctiva and sclera clear  NECK: Supple, No JVD  CHEST/LUNG: Clear to auscultation bilaterally; No rales, rhonchi, wheezing, or rubs  HEART: Regular rate and rhythm; No murmurs, rubs, or gallops  ABDOMEN: Soft, Nontender, Nondistended; Bowel sounds present  EXTREMITIES:  2+ Peripheral Pulses, No clubbing, cyanosis, or edema  NERVOUS SYSTEM:  Alert & Oriented X3, Good concentration; Motor Strength 5/5 B/L upper and lower extremities; DTRs 2+ intact and symmetric    ICU Vital Signs Last 24 Hrs  T(C): 36.8 (24 May 2021 07:10), Max: 37.5 (23 May 2021 20:00)  T(F): 98.2 (24 May 2021 07:10), Max: 99.5 (23 May 2021 20:00)  HR: 75 (24 May 2021 08:00) (62 - 90)  BP: 122/81 (24 May 2021 08:00) (118/75 - 160/85)  BP(mean): 92 (24 May 2021 08:00) (84 - 109)  ABP: --  ABP(mean): --  RR: 15 (24 May 2021 08:00) (12 - 21)  SpO2: 94% (24 May 2021 08:00) (91% - 97%)    I&O's Detail    23 May 2021 07:01  -  24 May 2021 07:00  --------------------------------------------------------  IN:    IV PiggyBack: 250 mL    Oral Fluid: 620 mL    sodium chloride 3%: 90 mL  Total IN: 960 mL    OUT:    Indwelling Catheter - Urethral (mL): 5 mL  Total OUT: 5 mL    Total NET: -1075 mL      24 May 2021 07:01  -  24 May 2021 08:45  --------------------------------------------------------  IN:  Total IN: 0 mL    OUT:    Indwelling Catheter - Urethral (mL): 100 mL  Total OUT: 100 mL    Total NET: -100 mL    MEDICATIONS  NEURO  Meds:   RESPIRATORY    Meds: ALBUTerol    90 MICROgram(s) HFA Inhaler 2 Puff(s) Inhalation every 6 hours PRN Shortness of Breath and/or Wheezing  budesonide 160 MICROgram(s)/formoterol 4.5 MICROgram(s) Inhaler 2 Puff(s) Inhalation two times a day    CARDIOVASCULAR  Meds: amLODIPine   Tablet 10 milliGRAM(s) Oral daily  buMETAnide 1 milliGRAM(s) Oral daily    GI/NUTRITION  Meds:   GENITOURINARY  Meds: potassium chloride    Tablet ER 40 milliEquivalent(s) Oral every 4 hours    HEMATOLOGIC  Meds: heparin   Injectable 5000 Unit(s) SubCutaneous every 12 hours    [x] VTE Prophylaxis  INFECTIOUS DISEASES  Meds:   ENDOCRINE  CAPILLARY BLOOD GLUCOSE        Meds:   OTHER MEDICATIONS:  chlorhexidine 2% Cloths 1 Application(s) Topical <User Schedule>  dorzolamide 2% Ophthalmic Solution 1 Drop(s) Both EYES three times a day  :    LABS:                        13.4   6.64  )-----------( 272      ( 24 May 2021 05:11 )             38.7      05-24    128<L>  |  88<L>  |  10  ----------------------------<  98  3.1<L>   |  35<H>  |  0.58    Ca    8.3<L>      24 May 2021 05:10  Phos  2.0     -  Mg     2.1     -24    TPro  6.7  /  Alb  3.4  /  TBili  1.1  /  DBili  x   /  AST  48<H>  /  ALT  46  /  AlkPhos  86  05-24      Urinalysis Basic - ( 22 May 2021 11:01 )    Color: Yellow / Appearance: Clear / S.005 / pH: x  Gluc: x / Ketone: Trace  / Bili: Negative / Urobili: Negative mg/dL   Blood: x / Protein: 30 mg/dL / Nitrite: Negative   Leuk Esterase: Negative / RBC: 0-2 /HPF / WBC 0-2   Sq Epi: x / Non Sq Epi: x / Bacteria: Few      Culture Results:   No growth ( @ 15:11)    Flu With COVID-19 By ROSAS (21 @ 08:19)   SARS-CoV-2 Result: Detected: EUA/IVD   This Respiratory Panel uses polymerase chain reaction (PCR) to detect for   influenza A; influenza B; and SARS-CoV-2.   This test was validated by [facility] and is in use under the FDA   Emergency Use Authorization (EUA) for clinical labs CLIA-certified to   perform high complexity testing. Test results should be correlated with   clinical presentation, patient history, and epidemiology.   Influenza A Result: NotDetec: EUA/IVD   Influenza B Result: NotDetec: EUA/IVD     RADIOLOGY & ADDITIONAL STUDIES:

## 2021-05-24 NOTE — DIETITIAN INITIAL EVALUATION ADULT. - PERTINENT MEDS FT
MEDICATIONS  (STANDING):  amLODIPine   Tablet 10 milliGRAM(s) Oral daily  budesonide 160 MICROgram(s)/formoterol 4.5 MICROgram(s) Inhaler 2 Puff(s) Inhalation two times a day  buMETAnide 1 milliGRAM(s) Oral daily  chlorhexidine 2% Cloths 1 Application(s) Topical <User Schedule>  dorzolamide 2% Ophthalmic Solution 1 Drop(s) Both EYES three times a day  heparin   Injectable 5000 Unit(s) SubCutaneous every 12 hours    MEDICATIONS  (PRN):  ALBUTerol    90 MICROgram(s) HFA Inhaler 2 Puff(s) Inhalation every 6 hours PRN Shortness of Breath and/or Wheezing

## 2021-05-24 NOTE — CHART NOTE - NSCHARTNOTEFT_GEN_A_CORE
79yo F with PMH of HTN and COPD presented to ED on 5/22/21 s/p witnessed seizures at home. In ED, pt found to be hyponatremic with sodium 120. Pt post-ictal in ED. Admitted to ICU for monitoring in setting of severe symptomatic hyponatremia. Pt also found to be Covid+, recent known Covid exposure. Multiple boluses of 3% NaCl administered per nephrology reccs; Na now 128. Pt currently awake and alert, no neurological deficits. No further seizures. No respiratory issues with covid; saturating well on room air. Pt is currently medically stable for downgrade to med/surg.     Pt seen and case discussed with ICU attending Dr. Blank.   Verbal handoff given to hospitalist Dr. Alarcon.

## 2021-05-24 NOTE — DIETITIAN INITIAL EVALUATION ADULT. - OTHER INFO
Pt w/ pmhx HTN ; COPD ; p/w seizure related to hyponatremia and also COVID +. She lives at home w/ her daughter whom does the food shopping / cooking. Denies N/V/D/C/Chewing/Swallowing issues, No food allergies. She Drinks almond milk and will eat cheese and eggs. Pt is a vegetarian. She will not eat Tofu, fish, meat, pork or chicken. She will accept [employer] x 1/day (provides 520 kcal, 22 g protein). Her daughter via telephone was in acceptance of this.  Pt reports that her mom has always been a small petite woman. Food preferences obtained.,

## 2021-05-24 NOTE — DIETITIAN NUTRITION RISK NOTIFICATION - TREATMENT: THE FOLLOWING DIET HAS BEEN RECOMMENDED
Diet, Regular:   Ovo Vegetarian (Accepts Eggs)  No Beef  No Fish  No Pork  No Poultry  Supplement Feeding Modality:  Oral  Health Shake Cans or Servings Per Day:  1       Frequency:  Daily (05-24-21 @ 12:22) [Pending Verification By Attending]  Diet, Regular:   800mL Fluid Restriction ([id number])  Lacto-Ovo Veg (Accepts Milk Prod., Eggs) (05-24-21 @ 12:14) [Active]

## 2021-05-24 NOTE — PROGRESS NOTE ADULT - SUBJECTIVE AND OBJECTIVE BOX
Patient is a 80y old  Female who presents with a chief complaint of hyponatremia, seizure, COVID-19 (24 May 2021 08:45)    still hyponatemic   covid infection     INTERVAL HPI/OVERNIGHT EVENTS:  PAST MEDICAL & SURGICAL HISTORY:  Essential hypertension    Asthma, unspecified asthma severity, unspecified whether complicated, unspecified whether persistent        MEDICATIONS  (STANDING):  amLODIPine   Tablet 10 milliGRAM(s) Oral daily  budesonide 160 MICROgram(s)/formoterol 4.5 MICROgram(s) Inhaler 2 Puff(s) Inhalation two times a day  buMETAnide 1 milliGRAM(s) Oral daily  chlorhexidine 2% Cloths 1 Application(s) Topical <User Schedule>  dorzolamide 2% Ophthalmic Solution 1 Drop(s) Both EYES three times a day  heparin   Injectable 5000 Unit(s) SubCutaneous every 12 hours    MEDICATIONS  (PRN):  ALBUTerol    90 MICROgram(s) HFA Inhaler 2 Puff(s) Inhalation every 6 hours PRN Shortness of Breath and/or Wheezing      Allergies    No Known Allergies    Intolerances        REVIEW OF SYSTEMS:  CONSTITUTIONAL: No fever, weight loss, or fatigue  EYES: No eye pain, visual disturbances, or discharge  ENMT:  No difficulty hearing, tinnitus, vertigo; No sinus or throat pain  NECK: No pain or stiffness  BREASTS: No pain, masses, or nipple discharge  RESPIRATORY: No cough, wheezing, chills or hemoptysis; No shortness of breath  CARDIOVASCULAR: No chest pain, palpitations, dizziness, or leg swelling  GASTROINTESTINAL: No abdominal or epigastric pain. No nausea, vomiting, or hematemesis; No diarrhea or constipation. No melena or hematochezia.  GENITOURINARY: No dysuria, frequency, hematuria, or incontinence  NEUROLOGICAL: No headaches, memory loss, loss of strength, numbness, or tremors  SKIN: No itching, burning, rashes, or lesions   LYMPH NODES: No enlarged glands  ENDOCRINE: No heat or cold intolerance; No hair loss  MUSCULOSKELETAL: No joint pain or swelling; No muscle, back, or extremity pain  PSYCHIATRIC: No depression, anxiety, mood swings, or difficulty sleeping  HEME/LYMPH: No easy bruising, or bleeding gums  ALLERY AND IMMUNOLOGIC: No hives or eczema    Vital Signs Last 24 Hrs  T(C): 36.8 (24 May 2021 07:10), Max: 37.5 (23 May 2021 20:00)  T(F): 98.2 (24 May 2021 07:10), Max: 99.5 (23 May 2021 20:00)  HR: 78 (24 May 2021 11:00) (62 - 90)  BP: 120/75 (24 May 2021 11:00) (106/75 - 160/85)  BP(mean): 85 (24 May 2021 11:00) (83 - 109)  RR: 15 (24 May 2021 11:00) (14 - 21)  SpO2: 97% (24 May 2021 11:00) (91% - 99%)    PHYSICAL EXAM:  GENERAL: NAD, well-groomed, well-developed  HEAD:  Atraumatic, Normocephalic  EYES: EOMI, PERRLA, conjunctiva and sclera clear  ENMT: No tonsillar erythema, exudates, or enlargement; Moist mucous membranes, Good dentition, No lesions  NECK: Supple, No JVD, Normal thyroid  NERVOUS SYSTEM:  Alert & Oriented X3, Good concentration; Motor Strength 5/5 B/L upper and lower extremities; DTRs 2+ intact and symmetric  CHEST/LUNG: Clear to percussion bilaterally; No rales, rhonchi, wheezing, or rubs  HEART: Regular rate and rhythm; No murmurs, rubs, or gallops  ABDOMEN: Soft, Nontender, Nondistended; Bowel sounds present  EXTREMITIES:  2+ Peripheral Pulses, No clubbing, cyanosis, or edema  LYMPH: No lymphadenopathy noted  SKIN: No rashes or lesions    LABS:                        13.4   6.64  )-----------( 272      ( 24 May 2021 05:11 )             38.7     05-24    128<L>  |  88<L>  |  10  ----------------------------<  98  3.1<L>   |  35<H>  |  0.58    Ca    8.3<L>      24 May 2021 05:10  Phos  2.0     05-24  Mg     2.1     05-24    TPro  6.7  /  Alb  3.4  /  TBili  1.1  /  DBili  x   /  AST  48<H>  /  ALT  46  /  AlkPhos  86  05-24          CAPILLARY BLOOD GLUCOSE                    RADIOLOGY & ADDITIONAL TESTS:    Imaging Personally Reviewed:  [ ] YES  [ ] NO    Consultant(s) Notes Reviewed:  [ ] YES  [ ] NO    Care Discussed with Consultants/Other Providers [ ] YES  [ ] NO    Care discussed with family,         [  ]   yes  [  ]  No    imp:    stable[ ]    unstable[  ]     improving [ x  ]       unchanged  [  ]                Plans:  Continue present plans  [x  ] as per critical care               New consult [  ]   specialty  .......               order test[  ]    test name.                  Discharge Planning  [  ]

## 2021-05-25 LAB
ALBUMIN SERPL ELPH-MCNC: 3.3 G/DL — SIGNIFICANT CHANGE UP (ref 3.3–5)
ALP SERPL-CCNC: 88 U/L — SIGNIFICANT CHANGE UP (ref 40–120)
ALT FLD-CCNC: 47 U/L — SIGNIFICANT CHANGE UP (ref 12–78)
ANION GAP SERPL CALC-SCNC: 7 MMOL/L — SIGNIFICANT CHANGE UP (ref 5–17)
AST SERPL-CCNC: 46 U/L — HIGH (ref 15–37)
BASOPHILS # BLD AUTO: 0.01 K/UL — SIGNIFICANT CHANGE UP (ref 0–0.2)
BASOPHILS NFR BLD AUTO: 0.1 % — SIGNIFICANT CHANGE UP (ref 0–2)
BILIRUB SERPL-MCNC: 0.9 MG/DL — SIGNIFICANT CHANGE UP (ref 0.2–1.2)
BUN SERPL-MCNC: 15 MG/DL — SIGNIFICANT CHANGE UP (ref 7–23)
CALCIUM SERPL-MCNC: 8.8 MG/DL — SIGNIFICANT CHANGE UP (ref 8.5–10.1)
CHLORIDE SERPL-SCNC: 91 MMOL/L — LOW (ref 96–108)
CO2 SERPL-SCNC: 32 MMOL/L — HIGH (ref 22–31)
COVID-19 NUCLEOCAPSID GAM AB INTERP: POSITIVE
COVID-19 NUCLEOCAPSID TOTAL GAM ANTIBODY RESULT: 4.07 INDEX — HIGH
COVID-19 SPIKE DOMAIN AB INTERP: POSITIVE
COVID-19 SPIKE DOMAIN ANTIBODY RESULT: 2.08 U/ML — HIGH
CREAT SERPL-MCNC: 0.67 MG/DL — SIGNIFICANT CHANGE UP (ref 0.5–1.3)
EOSINOPHIL # BLD AUTO: 0.02 K/UL — SIGNIFICANT CHANGE UP (ref 0–0.5)
EOSINOPHIL NFR BLD AUTO: 0.2 % — SIGNIFICANT CHANGE UP (ref 0–6)
GLUCOSE SERPL-MCNC: 85 MG/DL — SIGNIFICANT CHANGE UP (ref 70–99)
HCT VFR BLD CALC: 39.8 % — SIGNIFICANT CHANGE UP (ref 34.5–45)
HGB BLD-MCNC: 13.3 G/DL — SIGNIFICANT CHANGE UP (ref 11.5–15.5)
IMM GRANULOCYTES NFR BLD AUTO: 0.5 % — SIGNIFICANT CHANGE UP (ref 0–1.5)
LYMPHOCYTES # BLD AUTO: 1.83 K/UL — SIGNIFICANT CHANGE UP (ref 1–3.3)
LYMPHOCYTES # BLD AUTO: 22.8 % — SIGNIFICANT CHANGE UP (ref 13–44)
MAGNESIUM SERPL-MCNC: 2.1 MG/DL — SIGNIFICANT CHANGE UP (ref 1.6–2.6)
MCHC RBC-ENTMCNC: 27.7 PG — SIGNIFICANT CHANGE UP (ref 27–34)
MCHC RBC-ENTMCNC: 33.4 GM/DL — SIGNIFICANT CHANGE UP (ref 32–36)
MCV RBC AUTO: 82.9 FL — SIGNIFICANT CHANGE UP (ref 80–100)
MONOCYTES # BLD AUTO: 1.25 K/UL — HIGH (ref 0–0.9)
MONOCYTES NFR BLD AUTO: 15.6 % — HIGH (ref 2–14)
NEUTROPHILS # BLD AUTO: 4.87 K/UL — SIGNIFICANT CHANGE UP (ref 1.8–7.4)
NEUTROPHILS NFR BLD AUTO: 60.8 % — SIGNIFICANT CHANGE UP (ref 43–77)
NRBC # BLD: 0 /100 WBCS — SIGNIFICANT CHANGE UP (ref 0–0)
PHOSPHATE SERPL-MCNC: 3.4 MG/DL — SIGNIFICANT CHANGE UP (ref 2.5–4.5)
PLATELET # BLD AUTO: 291 K/UL — SIGNIFICANT CHANGE UP (ref 150–400)
POTASSIUM SERPL-MCNC: 3.7 MMOL/L — SIGNIFICANT CHANGE UP (ref 3.5–5.3)
POTASSIUM SERPL-SCNC: 3.7 MMOL/L — SIGNIFICANT CHANGE UP (ref 3.5–5.3)
PROT SERPL-MCNC: 6.5 GM/DL — SIGNIFICANT CHANGE UP (ref 6–8.3)
RBC # BLD: 4.8 M/UL — SIGNIFICANT CHANGE UP (ref 3.8–5.2)
RBC # FLD: 12.6 % — SIGNIFICANT CHANGE UP (ref 10.3–14.5)
SARS-COV-2 IGG+IGM SERPL QL IA: 2.08 U/ML — HIGH
SARS-COV-2 IGG+IGM SERPL QL IA: 4.07 INDEX — HIGH
SARS-COV-2 IGG+IGM SERPL QL IA: POSITIVE
SARS-COV-2 IGG+IGM SERPL QL IA: POSITIVE
SODIUM SERPL-SCNC: 130 MMOL/L — LOW (ref 135–145)
WBC # BLD: 8.02 K/UL — SIGNIFICANT CHANGE UP (ref 3.8–10.5)
WBC # FLD AUTO: 8.02 K/UL — SIGNIFICANT CHANGE UP (ref 3.8–10.5)

## 2021-05-25 RX ORDER — POTASSIUM CHLORIDE 20 MEQ
40 PACKET (EA) ORAL ONCE
Refills: 0 | Status: COMPLETED | OUTPATIENT
Start: 2021-05-25 | End: 2021-05-25

## 2021-05-25 RX ORDER — SENNA PLUS 8.6 MG/1
2 TABLET ORAL AT BEDTIME
Refills: 0 | Status: DISCONTINUED | OUTPATIENT
Start: 2021-05-25 | End: 2021-05-27

## 2021-05-25 RX ADMIN — BUDESONIDE AND FORMOTEROL FUMARATE DIHYDRATE 2 PUFF(S): 160; 4.5 AEROSOL RESPIRATORY (INHALATION) at 06:07

## 2021-05-25 RX ADMIN — Medication 40 MILLIEQUIVALENT(S): at 12:19

## 2021-05-25 RX ADMIN — AMLODIPINE BESYLATE 10 MILLIGRAM(S): 2.5 TABLET ORAL at 17:03

## 2021-05-25 RX ADMIN — LIDOCAINE 1 PATCH: 4 CREAM TOPICAL at 21:13

## 2021-05-25 RX ADMIN — SENNA PLUS 2 TABLET(S): 8.6 TABLET ORAL at 23:33

## 2021-05-25 RX ADMIN — HEPARIN SODIUM 5000 UNIT(S): 5000 INJECTION INTRAVENOUS; SUBCUTANEOUS at 17:03

## 2021-05-25 RX ADMIN — BUDESONIDE AND FORMOTEROL FUMARATE DIHYDRATE 2 PUFF(S): 160; 4.5 AEROSOL RESPIRATORY (INHALATION) at 17:03

## 2021-05-25 RX ADMIN — BUMETANIDE 1 MILLIGRAM(S): 0.25 INJECTION INTRAMUSCULAR; INTRAVENOUS at 06:08

## 2021-05-25 RX ADMIN — CHLORHEXIDINE GLUCONATE 1 APPLICATION(S): 213 SOLUTION TOPICAL at 06:04

## 2021-05-25 RX ADMIN — LIDOCAINE 1 PATCH: 4 CREAM TOPICAL at 23:37

## 2021-05-25 RX ADMIN — Medication 5 MILLIGRAM(S): at 23:33

## 2021-05-25 RX ADMIN — HEPARIN SODIUM 5000 UNIT(S): 5000 INJECTION INTRAVENOUS; SUBCUTANEOUS at 06:04

## 2021-05-25 RX ADMIN — DORZOLAMIDE HYDROCHLORIDE 1 DROP(S): 20 SOLUTION/ DROPS OPHTHALMIC at 21:18

## 2021-05-25 RX ADMIN — LIDOCAINE 1 PATCH: 4 CREAM TOPICAL at 12:20

## 2021-05-25 RX ADMIN — DORZOLAMIDE HYDROCHLORIDE 1 DROP(S): 20 SOLUTION/ DROPS OPHTHALMIC at 06:13

## 2021-05-25 RX ADMIN — DORZOLAMIDE HYDROCHLORIDE 1 DROP(S): 20 SOLUTION/ DROPS OPHTHALMIC at 12:20

## 2021-05-25 NOTE — PROGRESS NOTE ADULT - SUBJECTIVE AND OBJECTIVE BOX
Patient is a 80y old  Female who presents with a chief complaint of hyponatremia, seizure, COVID-19 (24 May 2021 18:36)  sodium is 130 today. - improving  covid positive   vitals stable  no distress   no more seizures   no respiratory distress      INTERVAL HPI/OVERNIGHT EVENTS:  PAST MEDICAL & SURGICAL HISTORY:  Essential hypertension    Asthma, unspecified asthma severity, unspecified whether complicated, unspecified whether persistent        MEDICATIONS  (STANDING):  amLODIPine   Tablet 10 milliGRAM(s) Oral daily  budesonide 160 MICROgram(s)/formoterol 4.5 MICROgram(s) Inhaler 2 Puff(s) Inhalation two times a day  buMETAnide 1 milliGRAM(s) Oral daily  chlorhexidine 2% Cloths 1 Application(s) Topical <User Schedule>  dorzolamide 2% Ophthalmic Solution 1 Drop(s) Both EYES three times a day  heparin   Injectable 5000 Unit(s) SubCutaneous every 12 hours  lidocaine   Patch 1 Patch Transdermal daily    MEDICATIONS  (PRN):  ALBUTerol    90 MICROgram(s) HFA Inhaler 2 Puff(s) Inhalation every 6 hours PRN Shortness of Breath and/or Wheezing      Allergies    No Known Allergies    Intolerances        REVIEW OF SYSTEMS:  CONSTITUTIONAL: No fever, weight loss, or fatigue  EYES: No eye pain, visual disturbances, or discharge  ENMT:  No difficulty hearing, tinnitus, vertigo; No sinus or throat pain  NECK: No pain or stiffness  BREASTS: No pain, masses, or nipple discharge  RESPIRATORY: No cough, wheezing, chills or hemoptysis; No shortness of breath  CARDIOVASCULAR: No chest pain, palpitations, dizziness, or leg swelling  GASTROINTESTINAL: No abdominal or epigastric pain. No nausea, vomiting, or hematemesis; No diarrhea or constipation. No melena or hematochezia.  GENITOURINARY: No dysuria, frequency, hematuria, or incontinence  NEUROLOGICAL: No headaches, memory loss, loss of strength, numbness, or tremors  SKIN: No itching, burning, rashes, or lesions   LYMPH NODES: No enlarged glands  ENDOCRINE: No heat or cold intolerance; No hair loss  MUSCULOSKELETAL: No joint pain or swelling; No muscle, back, or extremity pain  PSYCHIATRIC: No depression, anxiety, mood swings, or difficulty sleeping  HEME/LYMPH: No easy bruising, or bleeding gums  ALLERY AND IMMUNOLOGIC: No hives or eczema    Vital Signs Last 24 Hrs  T(C): 36.6 (25 May 2021 05:24), Max: 36.8 (24 May 2021 21:13)  T(F): 97.8 (25 May 2021 05:24), Max: 98.3 (24 May 2021 21:13)  HR: 64 (25 May 2021 05:24) (63 - 80)  BP: 118/78 (25 May 2021 05:24) (102/67 - 145/78)  BP(mean): 93 (24 May 2021 21:13) (76 - 93)  RR: 18 (25 May 2021 05:24) (14 - 20)  SpO2: 95% (25 May 2021 05:24) (91% - 100%)    PHYSICAL EXAM:  GENERAL: NAD, well-groomed, well-developed  HEAD:  Atraumatic, Normocephalic  EYES: EOMI, PERRLA, conjunctiva and sclera clear  ENMT: No tonsillar erythema, exudates, or enlargement; Moist mucous membranes, Good dentition, No lesions  NECK: Supple, No JVD, Normal thyroid  NERVOUS SYSTEM:  Alert & Oriented X3, Good concentration; Motor Strength 5/5 B/L upper and lower extremities; DTRs 2+ intact and symmetric  CHEST/LUNG: Clear to percussion bilaterally; No rales, rhonchi, wheezing, or rubs  HEART: Regular rate and rhythm; No murmurs, rubs, or gallops  ABDOMEN: Soft, Nontender, Nondistended; Bowel sounds present  EXTREMITIES:  2+ Peripheral Pulses, No clubbing, cyanosis, or edema  LYMPH: No lymphadenopathy noted  SKIN: No rashes or lesions    LABS:                        13.3   8.02  )-----------( 291      ( 25 May 2021 08:28 )             39.8     05-25    130<L>  |  91<L>  |  15  ----------------------------<  85  3.7   |  32<H>  |  0.67    Ca    8.8      25 May 2021 08:28  Phos  3.4     05-25  Mg     2.1     05-25    TPro  6.5  /  Alb  3.3  /  TBili  0.9  /  DBili  x   /  AST  46<H>  /  ALT  47  /  AlkPhos  88  05-25          CAPILLARY BLOOD GLUCOSE                    RADIOLOGY & ADDITIONAL TESTS:    Imaging Personally Reviewed:  [ ] YES  [ ] NO    Consultant(s) Notes Reviewed:  [ ] YES  [ ] NO    Care Discussed with Consultants/Other Providers [ ] YES  [ ] NO    Care discussed with family,         [ x ]   yes  [  ]  No    imp:    stable[ x]    unstable[  ]     improving [ x  ]       unchanged  [  ]                Plans:  Continue present plans  [ x ] as per renal               New consult [  ]   specialty  .......               order test[  ]    test name.                  Discharge Planning  [  ]

## 2021-05-25 NOTE — PROGRESS NOTE ADULT - SUBJECTIVE AND OBJECTIVE BOX
[facility] NEPHROLOGY SERVICES, [facility]  NEPHROLOGY AND HYPERTENSION  [address]  [address]  [place] [address]  [phone]    MD BERNABE PEÑALOZA, MD CAROLINE MAST MD YELENA ROSENBERG, MD CHRISTOPHER AHN, MD JED BURNS MD          Patient events noted    MEDICATIONS  (STANDING):  amLODIPine   Tablet 10 milliGRAM(s) Oral daily  budesonide 160 MICROgram(s)/formoterol 4.5 MICROgram(s) Inhaler 2 Puff(s) Inhalation two times a day  buMETAnide 1 milliGRAM(s) Oral daily  chlorhexidine 2% Cloths 1 Application(s) Topical <User Schedule>  dorzolamide 2% Ophthalmic Solution 1 Drop(s) Both EYES three times a day  heparin   Injectable 5000 Unit(s) SubCutaneous every 12 hours  lidocaine   Patch 1 Patch Transdermal daily    MEDICATIONS  (PRN):  ALBUTerol    90 MICROgram(s) HFA Inhaler 2 Puff(s) Inhalation every 6 hours PRN Shortness of Breath and/or Wheezing      05-24-21 @ 07:01  -  05-25-21 @ 07:00  --------------------------------------------------------  IN: 837 mL / OUT: 750 mL / NET: 87 mL      PHYSICAL EXAM:      T(C): 37.1 (05-25-21 @ 15:25), Max: 37.1 (05-25-21 @ 15:25)  HR: 90 (05-25-21 @ 15:25) (63 - 90)  BP: 130/82 (05-25-21 @ 15:25) (107/74 - 145/78)  RR: 18 (05-25-21 @ 15:25) (17 - 20)  SpO2: 97% (05-25-21 @ 15:25) (94% - 97%)  Wt(kg): --  Lungs clear  Heart S1S2  Abd soft NT ND  Extremities:   tr edema                                    13.3   8.02  )-----------( 291      ( 25 May 2021 08:28 )             39.8     05-25    130<L>  |  91<L>  |  15  ----------------------------<  85  3.7   |  32<H>  |  0.67    Ca    8.8      25 May 2021 08:28  Phos  3.4     05-25  Mg     2.1     05-25    TPro  6.5  /  Alb  3.3  /  TBili  0.9  /  DBili  x   /  AST  46<H>  /  ALT  47  /  AlkPhos  88  05-25      LIVER FUNCTIONS - ( 25 May 2021 08:28 )  Alb: 3.3 g/dL / Pro: 6.5 gm/dL / ALK PHOS: 88 U/L / ALT: 47 U/L / AST: 46 U/L / GGT: x           Creatinine Trend: 0.67<--, 0.58<--, 0.70<--, 0.53<--, 0.56<--, 0.57<--        Assessment   Hyponatremia; combination of avid water intake; SIADH; poor solute intake  Improving     Plan:    Loop direutic added   FR  Will follow.:      German Goldstein MD

## 2021-05-26 LAB
ANION GAP SERPL CALC-SCNC: 6 MMOL/L — SIGNIFICANT CHANGE UP (ref 5–17)
BUN SERPL-MCNC: 13 MG/DL — SIGNIFICANT CHANGE UP (ref 7–23)
CALCIUM SERPL-MCNC: 9.1 MG/DL — SIGNIFICANT CHANGE UP (ref 8.5–10.1)
CHLORIDE SERPL-SCNC: 91 MMOL/L — LOW (ref 96–108)
CO2 SERPL-SCNC: 35 MMOL/L — HIGH (ref 22–31)
CREAT SERPL-MCNC: 0.65 MG/DL — SIGNIFICANT CHANGE UP (ref 0.5–1.3)
GLUCOSE SERPL-MCNC: 107 MG/DL — HIGH (ref 70–99)
HCT VFR BLD CALC: 42 % — SIGNIFICANT CHANGE UP (ref 34.5–45)
HGB BLD-MCNC: 13.9 G/DL — SIGNIFICANT CHANGE UP (ref 11.5–15.5)
MCHC RBC-ENTMCNC: 27.9 PG — SIGNIFICANT CHANGE UP (ref 27–34)
MCHC RBC-ENTMCNC: 33.1 GM/DL — SIGNIFICANT CHANGE UP (ref 32–36)
MCV RBC AUTO: 84.2 FL — SIGNIFICANT CHANGE UP (ref 80–100)
NRBC # BLD: 0 /100 WBCS — SIGNIFICANT CHANGE UP (ref 0–0)
PLATELET # BLD AUTO: 346 K/UL — SIGNIFICANT CHANGE UP (ref 150–400)
POTASSIUM SERPL-MCNC: 3.6 MMOL/L — SIGNIFICANT CHANGE UP (ref 3.5–5.3)
POTASSIUM SERPL-SCNC: 3.6 MMOL/L — SIGNIFICANT CHANGE UP (ref 3.5–5.3)
RBC # BLD: 4.99 M/UL — SIGNIFICANT CHANGE UP (ref 3.8–5.2)
RBC # FLD: 12.9 % — SIGNIFICANT CHANGE UP (ref 10.3–14.5)
SODIUM SERPL-SCNC: 132 MMOL/L — LOW (ref 135–145)
WBC # BLD: 7.72 K/UL — SIGNIFICANT CHANGE UP (ref 3.8–10.5)
WBC # FLD AUTO: 7.72 K/UL — SIGNIFICANT CHANGE UP (ref 3.8–10.5)

## 2021-05-26 RX ORDER — SENNA PLUS 8.6 MG/1
2 TABLET ORAL
Qty: 0 | Refills: 0 | DISCHARGE
Start: 2021-05-26

## 2021-05-26 RX ADMIN — HEPARIN SODIUM 5000 UNIT(S): 5000 INJECTION INTRAVENOUS; SUBCUTANEOUS at 05:03

## 2021-05-26 RX ADMIN — DORZOLAMIDE HYDROCHLORIDE 1 DROP(S): 20 SOLUTION/ DROPS OPHTHALMIC at 14:22

## 2021-05-26 RX ADMIN — LIDOCAINE 1 PATCH: 4 CREAM TOPICAL at 11:48

## 2021-05-26 RX ADMIN — DORZOLAMIDE HYDROCHLORIDE 1 DROP(S): 20 SOLUTION/ DROPS OPHTHALMIC at 21:14

## 2021-05-26 RX ADMIN — SENNA PLUS 2 TABLET(S): 8.6 TABLET ORAL at 21:14

## 2021-05-26 RX ADMIN — BUDESONIDE AND FORMOTEROL FUMARATE DIHYDRATE 2 PUFF(S): 160; 4.5 AEROSOL RESPIRATORY (INHALATION) at 05:02

## 2021-05-26 RX ADMIN — HEPARIN SODIUM 5000 UNIT(S): 5000 INJECTION INTRAVENOUS; SUBCUTANEOUS at 17:24

## 2021-05-26 RX ADMIN — LIDOCAINE 1 PATCH: 4 CREAM TOPICAL at 20:25

## 2021-05-26 RX ADMIN — BUDESONIDE AND FORMOTEROL FUMARATE DIHYDRATE 2 PUFF(S): 160; 4.5 AEROSOL RESPIRATORY (INHALATION) at 17:24

## 2021-05-26 RX ADMIN — Medication 5 MILLIGRAM(S): at 21:14

## 2021-05-26 RX ADMIN — LIDOCAINE 1 PATCH: 4 CREAM TOPICAL at 23:50

## 2021-05-26 RX ADMIN — DORZOLAMIDE HYDROCHLORIDE 1 DROP(S): 20 SOLUTION/ DROPS OPHTHALMIC at 05:02

## 2021-05-26 RX ADMIN — CHLORHEXIDINE GLUCONATE 1 APPLICATION(S): 213 SOLUTION TOPICAL at 05:03

## 2021-05-26 RX ADMIN — BUMETANIDE 1 MILLIGRAM(S): 0.25 INJECTION INTRAMUSCULAR; INTRAVENOUS at 05:03

## 2021-05-26 NOTE — PHYSICAL THERAPY INITIAL EVALUATION ADULT - MODALITIES TREATMENT COMMENTS
Chest Auscultation: coarse, end inspiratory crackles to both low bases--cleared c strong, dry cough.

## 2021-05-26 NOTE — DISCHARGE NOTE PROVIDER - DETAILS OF MALNUTRITION DIAGNOSIS/DIAGNOSES
This patient has been assessed with a concern for Malnutrition and was treated during this hospitalization for the following Nutrition diagnosis/diagnoses:     -  05/24/2021: Moderate protein-calorie malnutrition

## 2021-05-26 NOTE — DISCHARGE NOTE PROVIDER - NSDCCPCAREPLAN_GEN_ALL_CORE_FT
PRINCIPAL DISCHARGE DIAGNOSIS  Diagnosis: Hyponatremia  Assessment and Plan of Treatment: secondary to fluid overload vs SIAHD. cautious with excessive fluid intake. Please repeat blood work for electrolyte monitoring qd until stable      SECONDARY DISCHARGE DIAGNOSES  Diagnosis: Asthma, unspecified asthma severity, unspecified whether complicated, unspecified whether persistent  Assessment and Plan of Treatment: continue inhalers    Diagnosis: Essential hypertension  Assessment and Plan of Treatment: continue blood pressure medication    Diagnosis: New onset seizure  Assessment and Plan of Treatment: secondary to severe hyponatremia. now resolved    Diagnosis: Hypokalemia  Assessment and Plan of Treatment: supplemented    Diagnosis: COVID-19  Assessment and Plan of Treatment: covid antibody positive. covid PCR +

## 2021-05-26 NOTE — PHYSICAL THERAPY INITIAL EVALUATION ADULT - PERTINENT HX OF CURRENT PROBLEM, REHAB EVAL
5/22 ED for witnessed seizures. Recovering from URTI. Not Covid vaccinated. (+) Covid, COPD. CT Head no acute changes. XRay Impression: decreased lung volume, raised right hemidiaphragm, patchy opacities. Syriac Interpretation utilized (Db [id number]).

## 2021-05-26 NOTE — PHYSICAL THERAPY INITIAL EVALUATION ADULT - ADDITIONAL COMMENTS
Per patient, lives c adult children and their families in private house with threshold to enter, 3 stair steps to enter to bedroom with only walls to handle. Denies supplemental O2. Reports fell not too long ago--without resultant injury.

## 2021-05-26 NOTE — DISCHARGE NOTE PROVIDER - NSDCMRMEDTOKEN_GEN_ALL_CORE_FT
amLODIPine:   Azopt 1% ophthalmic suspension: 1 drop(s) to each affected eye 3 times a day  bisacodyl 5 mg oral delayed release tablet: 1 tab(s) orally once a day (at bedtime)  senna oral tablet: 2 tab(s) orally once a day (at bedtime)  Symbicort:    amLODIPine:   Azopt 1% ophthalmic suspension: 1 drop(s) to each affected eye 3 times a day  Symbicort:

## 2021-05-26 NOTE — PROGRESS NOTE ADULT - SUBJECTIVE AND OBJECTIVE BOX
Patient is a 80y old  Female who presents with a chief complaint of hyponatremia, seizure, COVID-19 (25 May 2021 19:47)  Hyponatremia improving   no more seizures   possible discharge tomorrow after Pt eval      INTERVAL HPI/OVERNIGHT EVENTS:  PAST MEDICAL & SURGICAL HISTORY:  Essential hypertension    Asthma, unspecified asthma severity, unspecified whether complicated, unspecified whether persistent        MEDICATIONS  (STANDING):  amLODIPine   Tablet 10 milliGRAM(s) Oral daily  bisacodyl 5 milliGRAM(s) Oral at bedtime  budesonide 160 MICROgram(s)/formoterol 4.5 MICROgram(s) Inhaler 2 Puff(s) Inhalation two times a day  buMETAnide 1 milliGRAM(s) Oral daily  chlorhexidine 2% Cloths 1 Application(s) Topical <User Schedule>  dorzolamide 2% Ophthalmic Solution 1 Drop(s) Both EYES three times a day  heparin   Injectable 5000 Unit(s) SubCutaneous every 12 hours  lidocaine   Patch 1 Patch Transdermal daily  senna 2 Tablet(s) Oral at bedtime    MEDICATIONS  (PRN):  ALBUTerol    90 MICROgram(s) HFA Inhaler 2 Puff(s) Inhalation every 6 hours PRN Shortness of Breath and/or Wheezing      Allergies    No Known Allergies    Intolerances        REVIEW OF SYSTEMS:  CONSTITUTIONAL: No fever, weight loss, or fatigue  EYES: No eye pain, visual disturbances, or discharge  ENMT:  No difficulty hearing, tinnitus, vertigo; No sinus or throat pain  NECK: No pain or stiffness  BREASTS: No pain, masses, or nipple discharge  RESPIRATORY: No cough, wheezing, chills or hemoptysis; No shortness of breath  CARDIOVASCULAR: No chest pain, palpitations, dizziness, or leg swelling  GASTROINTESTINAL: No abdominal or epigastric pain. No nausea, vomiting, or hematemesis; No diarrhea or constipation. No melena or hematochezia.  GENITOURINARY: No dysuria, frequency, hematuria, or incontinence  NEUROLOGICAL: No headaches, memory loss, loss of strength, numbness, or tremors  SKIN: No itching, burning, rashes, or lesions   LYMPH NODES: No enlarged glands  ENDOCRINE: No heat or cold intolerance; No hair loss  MUSCULOSKELETAL: No joint pain or swelling; No muscle, back, or extremity pain  PSYCHIATRIC: No depression, anxiety, mood swings, or difficulty sleeping  HEME/LYMPH: No easy bruising, or bleeding gums  ALLERY AND IMMUNOLOGIC: No hives or eczema    Vital Signs Last 24 Hrs  T(C): 36.9 (26 May 2021 05:11), Max: 37.1 (25 May 2021 15:25)  T(F): 98.5 (26 May 2021 05:11), Max: 98.7 (25 May 2021 15:25)  HR: 73 (26 May 2021 05:11) (65 - 90)  BP: 127/84 (26 May 2021 05:11) (107/74 - 130/82)  BP(mean): --  RR: 18 (26 May 2021 05:11) (16 - 18)  SpO2: 96% (26 May 2021 05:11) (94% - 97%)    PHYSICAL EXAM:  GENERAL: NAD, well-groomed, well-developed  HEAD:  Atraumatic, Normocephalic  EYES: EOMI, PERRLA, conjunctiva and sclera clear  ENMT: No tonsillar erythema, exudates, or enlargement; Moist mucous membranes, Good dentition, No lesions  NECK: Supple, No JVD, Normal thyroid  NERVOUS SYSTEM:  Alert & Oriented X3, Good concentration; Motor Strength 5/5 B/L upper and lower extremities; DTRs 2+ intact and symmetric  CHEST/LUNG: Clear to percussion bilaterally; No rales, rhonchi, wheezing, or rubs  HEART: Regular rate and rhythm; No murmurs, rubs, or gallops  ABDOMEN: Soft, Nontender, Nondistended; Bowel sounds present  EXTREMITIES:  2+ Peripheral Pulses, No clubbing, cyanosis, or edema  LYMPH: No lymphadenopathy noted  SKIN: No rashes or lesions    LABS:                        13.9   7.72  )-----------( 346      ( 26 May 2021 09:10 )             42.0     05-26    132<L>  |  91<L>  |  13  ----------------------------<  107<H>  3.6   |  35<H>  |  0.65    Ca    9.1      26 May 2021 09:10  Phos  3.4     05-25  Mg     2.1     05-25    TPro  6.5  /  Alb  3.3  /  TBili  0.9  /  DBili  x   /  AST  46<H>  /  ALT  47  /  AlkPhos  88  05-25          CAPILLARY BLOOD GLUCOSE                    RADIOLOGY & ADDITIONAL TESTS:    Imaging Personally Reviewed:  [ ] YES  [ ] NO    Consultant(s) Notes Reviewed:  [ ] YES  [ ] NO    Care Discussed with Consultants/Other Providers [ ] YES  [ ] NO    Care discussed with family,         [  ]   yes  [  ]  No    imp:    stable[ x]    unstable[  ]     improving [   ]       unchanged  [  ]                Plans:  Continue present plans  [ x ]               New consult [  ]   specialty  .......               order test[  ]    test name.                  Discharge Planning  [x  ]

## 2021-05-26 NOTE — DISCHARGE NOTE PROVIDER - HOSPITAL COURSE
81yo F with PMH of HTN and COPD presented to ED on 5/22/21 s/p witnessed seizures at home. In ED, pt found to be hyponatremic with sodium 120. Pt post-ictal in ED. Admitted to ICU for monitoring in setting of severe symptomatic hyponatremia. Pt also found to be Covid+, recent known Covid exposure. Multiple boluses of 3% NaCl administered per nephrology reccs; Na now 128. Pt currently awake and alert, no neurological deficits. No further seizures. No respiratory issues with covid; saturating well on room air. Pt is currently medically stable for downgrade to med/surg on 5/24. Patient's sodium continues to improve on diuretics. *********Nephrology cleared for discharge************. physical therapy consulted and recommended ___________. Discussed with Dr. Alarcon, patient hemodynamically stable for discharge. 79yo F with PMH of HTN and COPD presented to ED on 5/22/21 s/p witnessed seizures at home. In ED, pt found to be hyponatremic with sodium 120. Pt post-ictal in ED. Admitted to ICU for monitoring in setting of severe symptomatic hyponatremia. Pt also found to be Covid+, recent known Covid exposure. Multiple boluses of 3% NaCl administered per nephrology reccs; Na now 128. Pt currently awake and alert, no neurological deficits. No further seizures. No respiratory issues with covid; saturating well on room air. Pt is currently medically stable for downgrade to med/surg on 5/24. Patient's sodium continues to improve on diuretics. Nephrology cleared for discharge. physical therapy consulted and recommended home with home PT.  Discussed with Dr. Alarcon, patient hemodynamically stable for discharge.

## 2021-05-27 VITALS
HEART RATE: 84 BPM | SYSTOLIC BLOOD PRESSURE: 113 MMHG | DIASTOLIC BLOOD PRESSURE: 75 MMHG | OXYGEN SATURATION: 95 % | TEMPERATURE: 98 F | RESPIRATION RATE: 18 BRPM

## 2021-05-27 LAB
ANION GAP SERPL CALC-SCNC: 10 MMOL/L — SIGNIFICANT CHANGE UP (ref 5–17)
BUN SERPL-MCNC: 18 MG/DL — SIGNIFICANT CHANGE UP (ref 7–23)
CALCIUM SERPL-MCNC: 8.9 MG/DL — SIGNIFICANT CHANGE UP (ref 8.5–10.1)
CHLORIDE SERPL-SCNC: 94 MMOL/L — LOW (ref 96–108)
CO2 SERPL-SCNC: 30 MMOL/L — SIGNIFICANT CHANGE UP (ref 22–31)
CREAT SERPL-MCNC: 0.71 MG/DL — SIGNIFICANT CHANGE UP (ref 0.5–1.3)
FLUAV AG NPH QL: SIGNIFICANT CHANGE UP
FLUBV AG NPH QL: SIGNIFICANT CHANGE UP
GLUCOSE SERPL-MCNC: 97 MG/DL — SIGNIFICANT CHANGE UP (ref 70–99)
HCT VFR BLD CALC: 40.5 % — SIGNIFICANT CHANGE UP (ref 34.5–45)
HGB BLD-MCNC: 13.5 G/DL — SIGNIFICANT CHANGE UP (ref 11.5–15.5)
MCHC RBC-ENTMCNC: 28 PG — SIGNIFICANT CHANGE UP (ref 27–34)
MCHC RBC-ENTMCNC: 33.3 GM/DL — SIGNIFICANT CHANGE UP (ref 32–36)
MCV RBC AUTO: 83.9 FL — SIGNIFICANT CHANGE UP (ref 80–100)
NRBC # BLD: 0 /100 WBCS — SIGNIFICANT CHANGE UP (ref 0–0)
PLATELET # BLD AUTO: 325 K/UL — SIGNIFICANT CHANGE UP (ref 150–400)
POTASSIUM SERPL-MCNC: 3.7 MMOL/L — SIGNIFICANT CHANGE UP (ref 3.5–5.3)
POTASSIUM SERPL-SCNC: 3.7 MMOL/L — SIGNIFICANT CHANGE UP (ref 3.5–5.3)
RBC # BLD: 4.83 M/UL — SIGNIFICANT CHANGE UP (ref 3.8–5.2)
RBC # FLD: 13.1 % — SIGNIFICANT CHANGE UP (ref 10.3–14.5)
SARS-COV-2 RNA SPEC QL NAA+PROBE: DETECTED
SODIUM SERPL-SCNC: 134 MMOL/L — LOW (ref 135–145)
WBC # BLD: 7.89 K/UL — SIGNIFICANT CHANGE UP (ref 3.8–10.5)
WBC # FLD AUTO: 7.89 K/UL — SIGNIFICANT CHANGE UP (ref 3.8–10.5)

## 2021-05-27 RX ADMIN — DORZOLAMIDE HYDROCHLORIDE 1 DROP(S): 20 SOLUTION/ DROPS OPHTHALMIC at 13:43

## 2021-05-27 RX ADMIN — LIDOCAINE 1 PATCH: 4 CREAM TOPICAL at 12:14

## 2021-05-27 RX ADMIN — DORZOLAMIDE HYDROCHLORIDE 1 DROP(S): 20 SOLUTION/ DROPS OPHTHALMIC at 05:17

## 2021-05-27 RX ADMIN — BUMETANIDE 1 MILLIGRAM(S): 0.25 INJECTION INTRAMUSCULAR; INTRAVENOUS at 05:17

## 2021-05-27 RX ADMIN — HEPARIN SODIUM 5000 UNIT(S): 5000 INJECTION INTRAVENOUS; SUBCUTANEOUS at 05:17

## 2021-05-27 RX ADMIN — CHLORHEXIDINE GLUCONATE 1 APPLICATION(S): 213 SOLUTION TOPICAL at 05:17

## 2021-05-27 RX ADMIN — BUDESONIDE AND FORMOTEROL FUMARATE DIHYDRATE 2 PUFF(S): 160; 4.5 AEROSOL RESPIRATORY (INHALATION) at 05:17

## 2021-05-27 NOTE — PROGRESS NOTE ADULT - REASON FOR ADMISSION
hyponatremia, seizure, COVID-19

## 2021-05-27 NOTE — PROGRESS NOTE ADULT - SUBJECTIVE AND OBJECTIVE BOX
Patient is a 80y old  Female who presents with a chief complaint of hyponatremia, seizure, COVID-19 (26 May 2021 11:11)  hyponatemia reolved.    no distress   vitals stable.  stable for discharge    INTERVAL HPI/OVERNIGHT EVENTS:  PAST MEDICAL & SURGICAL HISTORY:  Essential hypertension    Asthma, unspecified asthma severity, unspecified whether complicated, unspecified whether persistent        MEDICATIONS  (STANDING):  amLODIPine   Tablet 10 milliGRAM(s) Oral daily  bisacodyl 5 milliGRAM(s) Oral at bedtime  budesonide 160 MICROgram(s)/formoterol 4.5 MICROgram(s) Inhaler 2 Puff(s) Inhalation two times a day  buMETAnide 1 milliGRAM(s) Oral daily  chlorhexidine 2% Cloths 1 Application(s) Topical <User Schedule>  dorzolamide 2% Ophthalmic Solution 1 Drop(s) Both EYES three times a day  heparin   Injectable 5000 Unit(s) SubCutaneous every 12 hours  lidocaine   Patch 1 Patch Transdermal daily  senna 2 Tablet(s) Oral at bedtime    MEDICATIONS  (PRN):  ALBUTerol    90 MICROgram(s) HFA Inhaler 2 Puff(s) Inhalation every 6 hours PRN Shortness of Breath and/or Wheezing      Allergies    No Known Allergies    Intolerances        REVIEW OF SYSTEMS:  CONSTITUTIONAL: No fever, weight loss, or fatigue  EYES: No eye pain, visual disturbances, or discharge  ENMT:  No difficulty hearing, tinnitus, vertigo; No sinus or throat pain  NECK: No pain or stiffness  BREASTS: No pain, masses, or nipple discharge  RESPIRATORY: No cough, wheezing, chills or hemoptysis; No shortness of breath  CARDIOVASCULAR: No chest pain, palpitations, dizziness, or leg swelling  GASTROINTESTINAL: No abdominal or epigastric pain. No nausea, vomiting, or hematemesis; No diarrhea or constipation. No melena or hematochezia.  GENITOURINARY: No dysuria, frequency, hematuria, or incontinence  NEUROLOGICAL: No headaches, memory loss, loss of strength, numbness, or tremors  SKIN: No itching, burning, rashes, or lesions   LYMPH NODES: No enlarged glands  ENDOCRINE: No heat or cold intolerance; No hair loss  MUSCULOSKELETAL: No joint pain or swelling; No muscle, back, or extremity pain  PSYCHIATRIC: No depression, anxiety, mood swings, or difficulty sleeping  HEME/LYMPH: No easy bruising, or bleeding gums  ALLERY AND IMMUNOLOGIC: No hives or eczema    Vital Signs Last 24 Hrs  T(C): 36.7 (27 May 2021 05:03), Max: 37 (26 May 2021 12:09)  T(F): 98.1 (27 May 2021 05:03), Max: 98.6 (26 May 2021 12:09)  HR: 68 (27 May 2021 05:03) (68 - 109)  BP: 110/72 (27 May 2021 05:03) (103/72 - 126/88)  BP(mean): --  RR: 18 (27 May 2021 05:03) (18 - 18)  SpO2: 94% (27 May 2021 05:03) (94% - 96%)    PHYSICAL EXAM:  GENERAL: NAD, well-groomed, well-developed  HEAD:  Atraumatic, Normocephalic  EYES: EOMI, PERRLA, conjunctiva and sclera clear  ENMT: No tonsillar erythema, exudates, or enlargement; Moist mucous membranes, Good dentition, No lesions  NECK: Supple, No JVD, Normal thyroid  NERVOUS SYSTEM:  Alert & Oriented X3, Good concentration; Motor Strength 5/5 B/L upper and lower extremities; DTRs 2+ intact and symmetric  CHEST/LUNG: Clear to percussion bilaterally; No rales, rhonchi, wheezing, or rubs  HEART: Regular rate and rhythm; No murmurs, rubs, or gallops  ABDOMEN: Soft, Nontender, Nondistended; Bowel sounds present  EXTREMITIES:  2+ Peripheral Pulses, No clubbing, cyanosis, or edema  LYMPH: No lymphadenopathy noted  SKIN: No rashes or lesions    LABS:                        13.5   7.89  )-----------( 325      ( 27 May 2021 08:59 )             40.5     05-27    134<L>  |  94<L>  |  18  ----------------------------<  97  3.7   |  30  |  0.71    Ca    8.9      27 May 2021 08:59            CAPILLARY BLOOD GLUCOSE                    RADIOLOGY & ADDITIONAL TESTS:    Imaging Personally Reviewed:  [ ] YES  [ ] NO    Consultant(s) Notes Reviewed:  [ ] YES  [ ] NO    Care Discussed with Consultants/Other Providers [ xYES  [ ] NO    Care discussed with family,         [  ]   yes  [  ]  No    imp:    stable[x ]    unstable[  ]     improving [   ]       unchanged  [  ]                Plans:  Continue present plans  [ x ]               New consult [  ]   specialty  .......               order test[  ]    test name.                  Discharge Planning  [ x ]

## 2021-05-27 NOTE — PROGRESS NOTE ADULT - NUTRITIONAL ASSESSMENT
This patient has been assessed with a concern for Malnutrition and has been determined to have a diagnosis/diagnoses of Moderate protein-calorie malnutrition.    This patient is being managed with:   Diet Regular-  Ovo Vegetarian (Accepts Eggs)  No Beef  No Fish  No Pork  No Poultry  Supplement Feeding Modality:  Oral  Health Shake Cans or Servings Per Day:  1       Frequency:  Daily  Entered: May 24 2021 12:22PM    

## 2021-05-27 NOTE — DISCHARGE NOTE NURSING/CASE MANAGEMENT/SOCIAL WORK - PATIENT PORTAL LINK FT
You can access the FollowMyHealth Patient Portal offered by [facility] by registering at the following website: [email or web address]. By joining [employer]’s FollowMyHealth portal, you will also be able to view your health information using other applications (apps) compatible with our system.

## 2021-05-27 NOTE — PROGRESS NOTE ADULT - PROVIDER SPECIALTY LIST ADULT
Nephrology
Critical Care
Internal Medicine
Internal Medicine
Critical Care
Internal Medicine
Internal Medicine

## 2021-05-27 NOTE — PROGRESS NOTE ADULT - TIME BILLING
clinical eval/  review labs/  discuss with patient/ PA/ team.
exam, review labs/ consultations. discuss with team/ RN
evaluation/ review labs.  discussion with team.
Rosamaria: I have seen and examined the patient face to face, have reviewed and addended the HPI, PE and a/p as necessary.
clinical exam/ review labs/ discuss with case management./ family

## 2021-05-27 NOTE — PROGRESS NOTE ADULT - ASSESSMENT
improving   still hyponatremic.   covid positive.
81 YO female with PMH HTN on amlodipine, COPD on symbicort p/w seizure  related to hyponatremia and also +covid    Neuro  seizures likely related to hyponatremia  CTH negative for acute dz  at baseline mental status, improved from time of admission    Pulm   cxr clear and current sat 99% on ra  will hold off further dexamethasone as no evidence of covid PNA  cont symbicort for copd    ID  +covid and does not require dexamethasone  daughter refusing remdesevir to  despite discussions regarding risk/benefit    Renal  Hyponatremia likely from siadh and ?polydipsia  s/p 3% this AM, now stopped  cont serial Na monitoring  Renal reccs appreciated    [facility]  cont norvasc for her baseline htn      
stable for discharge   s/p hyponatremia with seizure   hx of covid infection , still postive  HTN   Glaucoma  
79 yo female with HTN on amlodipine, COPD on symbicort a/w seizure  related to hyponatremia and also +covid.    Neuro: Seizures likely related to hyponatremia; ct h negative, now at baseline mental status.    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 Sodium steadily climbing.  Will trend I and Os.  Renal recs reviewed  ID: covid + and does not require dexamethasone;daughter refusing remdesevir to  despite discussions regarding risk/benefit  Dispo: Stable for transfer to floors.  
Problem/Recommendation - 1:  Problem: COVID-19. Recommendation: as per critical car/ monitor markes.     Problem/Recommendation - 2:  ·  Problem: Hypokalemia.  Recommendation: supplement-    check serum magnesium  renal follow up.      Problem/Recommendation - 3:  ·  Problem: Hyponatremia.  Recommendation: suplement and correct sodium slowly. / renal follow up.      Problem/Recommendation - 4:  ·  Problem: New onset seizure.  Recommendation: monitor for any more seizure activity.      Problem/Recommendation - 5:  ·  Problem: Essential hypertension.  Recommendation: mnitor and teat medically.      Problem/Recommendation - 6:  Problem: Asthma, unspecified asthma severity, unspecified whether complicated, unspecified whether persistent. Recommendation: monitornad manage with bronchodilators.    
s/p  seizures from hyponatremia   s/p covid infection with antibody   HTN   COPD

## 2021-06-03 DIAGNOSIS — E22.2 SYNDROME OF INAPPROPRIATE SECRETION OF ANTIDIURETIC HORMONE: ICD-10-CM

## 2021-06-03 DIAGNOSIS — J44.9 CHRONIC OBSTRUCTIVE PULMONARY DISEASE, UNSPECIFIED: ICD-10-CM

## 2021-06-03 DIAGNOSIS — U07.1 COVID-19: ICD-10-CM

## 2021-06-03 DIAGNOSIS — E87.1 HYPO-OSMOLALITY AND HYPONATREMIA: ICD-10-CM

## 2021-06-03 DIAGNOSIS — E87.6 HYPOKALEMIA: ICD-10-CM

## 2021-06-03 DIAGNOSIS — E44.0 MODERATE PROTEIN-CALORIE MALNUTRITION: ICD-10-CM

## 2021-06-03 DIAGNOSIS — J45.909 UNSPECIFIED ASTHMA, UNCOMPLICATED: ICD-10-CM

## 2021-06-03 DIAGNOSIS — H40.9 UNSPECIFIED GLAUCOMA: ICD-10-CM

## 2021-06-04 ENCOUNTER — EMERGENCY (EMERGENCY)
Facility: HOSPITAL | Age: 81
LOS: 0 days | Discharge: HOME | End: 2021-06-04
Attending: STUDENT IN AN ORGANIZED HEALTH CARE EDUCATION/TRAINING PROGRAM
Payer: MEDICARE

## 2021-06-04 VITALS
SYSTOLIC BLOOD PRESSURE: 148 MMHG | OXYGEN SATURATION: 94 % | DIASTOLIC BLOOD PRESSURE: 93 MMHG | HEART RATE: 62 BPM | RESPIRATION RATE: 18 BRPM | TEMPERATURE: 99 F

## 2021-06-04 VITALS — WEIGHT: 130.07 LBS | HEIGHT: 62 IN

## 2021-06-04 DIAGNOSIS — I10 ESSENTIAL (PRIMARY) HYPERTENSION: ICD-10-CM

## 2021-06-04 DIAGNOSIS — U07.1 COVID-19: ICD-10-CM

## 2021-06-04 DIAGNOSIS — R07.9 CHEST PAIN, UNSPECIFIED: ICD-10-CM

## 2021-06-04 DIAGNOSIS — J44.9 CHRONIC OBSTRUCTIVE PULMONARY DISEASE, UNSPECIFIED: ICD-10-CM

## 2021-06-04 DIAGNOSIS — J45.909 UNSPECIFIED ASTHMA, UNCOMPLICATED: ICD-10-CM

## 2021-06-04 DIAGNOSIS — R06.02 SHORTNESS OF BREATH: ICD-10-CM

## 2021-06-04 LAB
ALBUMIN SERPL ELPH-MCNC: 4 G/DL — SIGNIFICANT CHANGE UP (ref 3.3–5)
ALP SERPL-CCNC: 121 U/L — HIGH (ref 40–120)
ALT FLD-CCNC: 39 U/L — SIGNIFICANT CHANGE UP (ref 12–78)
ANION GAP SERPL CALC-SCNC: 5 MMOL/L — SIGNIFICANT CHANGE UP (ref 5–17)
AST SERPL-CCNC: 28 U/L — SIGNIFICANT CHANGE UP (ref 15–37)
BILIRUB SERPL-MCNC: 0.7 MG/DL — SIGNIFICANT CHANGE UP (ref 0.2–1.2)
BUN SERPL-MCNC: 9 MG/DL — SIGNIFICANT CHANGE UP (ref 7–23)
CALCIUM SERPL-MCNC: 9.2 MG/DL — SIGNIFICANT CHANGE UP (ref 8.5–10.1)
CHLORIDE SERPL-SCNC: 103 MMOL/L — SIGNIFICANT CHANGE UP (ref 96–108)
CO2 SERPL-SCNC: 30 MMOL/L — SIGNIFICANT CHANGE UP (ref 22–31)
CREAT SERPL-MCNC: 0.54 MG/DL — SIGNIFICANT CHANGE UP (ref 0.5–1.3)
FLUAV AG NPH QL: SIGNIFICANT CHANGE UP
FLUBV AG NPH QL: SIGNIFICANT CHANGE UP
GLUCOSE SERPL-MCNC: 91 MG/DL — SIGNIFICANT CHANGE UP (ref 70–99)
HCT VFR BLD CALC: 36.6 % — SIGNIFICANT CHANGE UP (ref 34.5–45)
HGB BLD-MCNC: 11.9 G/DL — SIGNIFICANT CHANGE UP (ref 11.5–15.5)
MAGNESIUM SERPL-MCNC: 2.2 MG/DL — SIGNIFICANT CHANGE UP (ref 1.6–2.6)
MCHC RBC-ENTMCNC: 28.3 PG — SIGNIFICANT CHANGE UP (ref 27–34)
MCHC RBC-ENTMCNC: 32.5 GM/DL — SIGNIFICANT CHANGE UP (ref 32–36)
MCV RBC AUTO: 86.9 FL — SIGNIFICANT CHANGE UP (ref 80–100)
NRBC # BLD: 0 /100 WBCS — SIGNIFICANT CHANGE UP (ref 0–0)
NT-PROBNP SERPL-SCNC: 461 PG/ML — HIGH (ref 0–450)
PLATELET # BLD AUTO: 300 K/UL — SIGNIFICANT CHANGE UP (ref 150–400)
POTASSIUM SERPL-MCNC: 4.4 MMOL/L — SIGNIFICANT CHANGE UP (ref 3.5–5.3)
POTASSIUM SERPL-SCNC: 4.4 MMOL/L — SIGNIFICANT CHANGE UP (ref 3.5–5.3)
PROT SERPL-MCNC: 7.6 GM/DL — SIGNIFICANT CHANGE UP (ref 6–8.3)
RBC # BLD: 4.21 M/UL — SIGNIFICANT CHANGE UP (ref 3.8–5.2)
RBC # FLD: 13.6 % — SIGNIFICANT CHANGE UP (ref 10.3–14.5)
SARS-COV-2 RNA SPEC QL NAA+PROBE: DETECTED
SODIUM SERPL-SCNC: 138 MMOL/L — SIGNIFICANT CHANGE UP (ref 135–145)
TROPONIN I SERPL-MCNC: <.015 NG/ML — SIGNIFICANT CHANGE UP (ref 0.01–0.04)
WBC # BLD: 7.16 K/UL — SIGNIFICANT CHANGE UP (ref 3.8–10.5)
WBC # FLD AUTO: 7.16 K/UL — SIGNIFICANT CHANGE UP (ref 3.8–10.5)

## 2021-06-04 PROCEDURE — 71045 X-RAY EXAM CHEST 1 VIEW: CPT | Mod: 26

## 2021-06-04 PROCEDURE — 93010 ELECTROCARDIOGRAM REPORT: CPT

## 2021-06-04 PROCEDURE — 71275 CT ANGIOGRAPHY CHEST: CPT | Mod: 26,MA

## 2021-06-04 PROCEDURE — 99285 EMERGENCY DEPT VISIT HI MDM: CPT | Mod: CS

## 2021-06-04 RX ORDER — IPRATROPIUM/ALBUTEROL SULFATE 18-103MCG
3 AEROSOL WITH ADAPTER (GRAM) INHALATION ONCE
Refills: 0 | Status: COMPLETED | OUTPATIENT
Start: 2021-06-04 | End: 2021-06-04

## 2021-06-04 RX ORDER — ALBUTEROL 90 UG/1
2 AEROSOL, METERED ORAL
Qty: 1 | Refills: 0
Start: 2021-06-04

## 2021-06-04 RX ADMIN — Medication 125 MILLIGRAM(S): at 15:08

## 2021-06-04 RX ADMIN — Medication 3 MILLILITER(S): at 11:23

## 2021-06-04 NOTE — ED PROVIDER NOTE - PATIENT PORTAL LINK FT
You can access the FollowMyHealth Patient Portal offered by [facility] by registering at the following website: [email or web address]. By joining [employer]’s FollowMyHealth portal, you will also be able to view your health information using other applications (apps) compatible with our system.

## 2021-06-04 NOTE — ED PROVIDER NOTE - PROGRESS NOTE DETAILS
jeremy covid positive, which is known from recent admission, CTA negative, jodie ambulated in ED with no sob, o2 saturation while ambulating 93%, which is acceptable range for a copd patient. will d/c with prednisone and albuterol for possible copd exacerbation,a dn d/c with pulmonology follow up

## 2021-06-04 NOTE — ED PROVIDER NOTE - OBJECTIVE STATEMENT
79yo F with PMH of HTN and COPD, d/c 1 week ago after admission for hyopnatremia and covid returns to ED for 2 days of shortness of breath and mild chest discomfort. no change in chronic cough. no fevers. no leg swelling or pain.

## 2021-06-04 NOTE — ED ADULT NURSE NOTE - OBJECTIVE STATEMENT
AAOx3, resps labored, pt unable to speak in complete sentences, skin warm and dry. Pt reports intermittent CP since this am, denies pain at this time, along with SOB. Pt having some difficulty speaking, SpO2 95% on RA. PMHX of asthma.

## 2021-06-04 NOTE — ED PROVIDER NOTE - MUSCULOSKELETAL, MLM
Spine appears normal, range of motion is not limited, no muscle or joint tenderness. no calf swelling or ttp

## 2021-06-04 NOTE — ED ADULT NURSE NOTE - NSIMPLEMENTINTERV_GEN_ALL_ED
Implemented All Universal Safety Interventions:  [place] to call system. Call bell, personal items and telephone within reach. Instruct patient to call for assistance. Room bathroom lighting operational. Non-slip footwear when patient is off stretcher. Physically safe environment: no spills, clutter or unnecessary equipment. Stretcher in lowest position, wheels locked, appropriate side rails in place.

## 2021-06-04 NOTE — ED PROVIDER NOTE - INCLUDE COVID-19 DISCHARGE INSTRUCTIONS
CT COVID-19 Call    - Primary Caregiver: No pcp   - Assessment Completed with: Femi  - Patient gave permission to discuss with daughter   - Patient has their AVS: Yes  - AVS was reviewed with the patient: Yes  - Since discharge, patient is feeling better  - Activity: improved  - The patient is taking all medications as prescribed.  AVS medications and  instructions were reviewed with the patient.   - Validate that any new medications were picked up: Yes  - Medication Review completed in Epic:  Yes  - The patient did not have questions or concerns regarding the medications prescribed.  - The patient is having pain at this time.  - The patient's appetite is Normal  - Patient is having bowel movements.  - Follow up care:  Upcoming appointments No pcp - instructed to f/u [facility]    - Safety Concerns:No   - Assisted Patient in making the following appointments Na  - Patient has a TCM Visit on No   - Reviewed appropriate contacts for questions / concerns -directed patient to call CT RN as first stop unless life threating situation  - Reviewed symptom management with Patient Yes  - Reviewed who/when Patient should contact regarding changes in symptoms:  Yes  - Reviewed what to do in case of a medical emergency and are positive for COVID-19  Yes  -  Reviewed instructions for quarantine with patient Yes  - Reviewed options for TCM Telephone or Video Visit  Yes  - Is the patient working with the Public Health Department  No  - Patient has the COVID-19 CDC Patient Handout  No   - Complete the Respiratory FlowSheet Assessment (if applicable) with emphasis on the following:   Coughing   Shortness of Breath   Persistent pain or chest pressure  - Complete the Vital Sign Assessment with most recent temperature & pain score  - Education provided to the patient: (Cite resources used)   <-------- Click here to INCLUDE CoVID-19 Discharge Instructions

## 2021-06-04 NOTE — ED ADULT TRIAGE NOTE - CHIEF COMPLAINT QUOTE
patient c/o of chest pain and difficulty breathing , started this morning , positive  Covid  1 week ago

## 2021-06-04 NOTE — ED PROVIDER NOTE - CARE PROVIDER_API CALL
Reece Alvarez)  Medicine  [address], [address]  [place] [address]  Phone: [phone]  Fax: [phone]  Follow Up Time:

## 2021-06-04 NOTE — ED PROVIDER NOTE - CLINICAL SUMMARY MEDICAL DECISION MAKING FREE TEXT BOX
VS within avveptable limits on ed presentation. eval for PE with TA considering recent hospitalization and covid diagnosis. lungs CTAB on exam, however will empirically treat for possible worsening of copd. VS within avveptable limits on ed presentation. eval for PE with TA considering recent hospitalization and covid diagnosis. lungs CTAB on exam, however will empirically treat for possible worsening of copd. cxr to eval for possible pna.

## 2021-07-30 NOTE — DIETITIAN NUTRITION RISK NOTIFICATION - PHYSICAL ASSESSMENT TEMPLES
Patient ID:    Krzysztof Kraft   [id number]  89 year old   11/3/1928                                           Admit Date: 7/30/2021          PMD: Bladimir Manrique MD                  CHIEF  COMPLAINT:               Weakness   Altered mental status         HPI:         \" 12:27 PM Krzysztof Kraft is a 89 year old female who presents to the ED via EMS from [facility] evaluation of generalized weakness that began 1 week ago. Two months ago, she had a fall and after she followed up with physical therapy, she noted to improve, though 1 week ago she began to decline with progressive generalized weakness. Her UA 1 week ago was unremarkable for a UTI and last night she developed urinary frequency. She also reports worsening from her chronic GIMENEZ over the past couple of weeks and per the son the pt had to shuffle around the past couple of days as she was not able to ambulate to the table. She denies nausea, vomiting, diarrhea, CP, and increased leg swelling. There are no further complaints or modifying factors at this time.\"- as per ED MD             Hx of fall 1 month ago and since neck pain - gets PT  .     \" has problems with drinking fluids - as per son  - not enough \"    Evaluation at ED normal except Na - 126. BUN 21.    Multiple medications stopped last month as per son .     Patient was seen and evaluated at the ED . She denies pain , alert, oriented to self , place .    Complaints of uncontrolled neck pain since fall.   Her vital signs normal except /76    neuro exam with no focal      ECG normal  , CXR stable    Patient is being admitted to medical service for further evaluation and treatment .         Past Medical History:   Diagnosis Date   • Arthritis    • Bronchitis    • Chronic pain     lower back pain (takes Advil/ uses heating pad)   • CKD (chronic kidney disease) stage 3, GFR 30-59 ml/min (CMS/[facility]) 6/21/2019   • Claustrophobia    • Constipation 2018   • Hyperlipidemia    •  Hypertension    • Left breast cysts    • Malignant neoplasm of breast (female), unspecified site 11/08/ 2009    left mod diff infiltrating ductal CA ( 35 sessions radiation done)   • Osteoarthritis gen'l    • Osteoporosis    • Other closed fractures of upper end of humerus 01/01/1996    left humeral head   • Premature ventricular contractions    • Urinary incontinence     stress incont- wears pads          Past Surgical History:   Procedure Laterality Date   • Breast surgery  1972    L breast cyst   • Breast surgery  11/18/2009    re-excision for pos inferior margin   • Breast surgery  11/25/2009    left with SN bx- mod diff infiltrating ductal CA   • Colonoscopy diagnostic  01/01/2006    Colonoscopy   • Eye surgery  2002    cataract   • Eye surgery Right 2007    cataract   • Fracture surgery Left 1996    humeral head fx (fell snowblowing)   • Joint replacement Left 11/2011    TKR   • Removal gallbladder  09/01/2000    Cholecystectomy   • Shoulder surg proc unlisted      Unspecified shoulder procedure   • Tissue ablation reconstruction atria lmited  01/01/1996    for V-tach        ALLERGIES:  No Known Allergies       Social History     Socioeconomic History   • Marital status:      Spouse name: Harman   • Number of children: 3   • Years of education: Not on file   • Highest education level: Not on file   Occupational History   • Occupation: retired-      Comment: [employer]   Tobacco Use   • Smoking status: Never Smoker   • Smokeless tobacco: Never Used   Substance and Sexual Activity   • Alcohol use: Yes     Alcohol/week: 1.0 standard drinks     Types: 1 Glasses of wine per week     Comment: Social   • Drug use: No   • Sexual activity: Not on file   Other Topics Concern   • Not on file   Social History Narrative   • Not on file     Social Determinants of Health     Financial Resource Strain:    • Social Determinants: Financial Resource Strain:    Food Insecurity:    • Social Determinants: Food  Insecurity:    Transportation Needs:    • Lack of Transportation (Medical):    • Lack of Transportation (Non-Medical):    Physical Activity:    • Days of Exercise per Week:    • Minutes of Exercise per Session:    Stress:    • Social Determinants: Stress:    Social Connections:    • Social Determinants: Social Connections:    Intimate Partner Violence: Not At Risk   • Social Determinants: Intimate Partner Violence Past Fear: No   • Social Determinants: Intimate Partner Violence Current Fear: No          Family History   Problem Relation Age of Onset   • Diabetes Mother    • Stroke Mother    • Cancer Father         Bladder and liver   • Stroke Father    • COPD Sister         on home 02   • Cancer Other         Cousin-breast   • Cancer Other         second cousins x 2- stomach          Medications Prior to Admission   Medication Sig Dispense Refill   • ASPIRIN 81 PO Take 81 mg by mouth daily after lunch.      • lisinopril-hydroCHLOROthiazide (ZESTORETIC) 20-12.5 MG per tablet Take 0.5 tablets by mouth daily. 90 tablet 1   • metoPROLOL succinate (TOPROL-XL) 25 MG 24 hr tablet TAKE ONE TABLET BY MOUTH DAILY 90 tablet 1   • acetaminophen (TYLENOL) 500 MG tablet Take 500 mg by mouth as needed for Pain.     • Melatonin 1 MG Tab Take 1 mg by mouth nightly as needed. Do not start before June 29, 2021.     • calcium carbonate-vitamin D (CALCIUM 600 + D) 600-400 MG-UNIT per tablet Take 1 tablet by mouth daily (with dinner).      • polyethylene glycol (GLYCOLAX, MIRALAX) packet Take 17 g by mouth every 3 days.      • ciprofloxacin (Cipro) 250 MG tablet Take 1 tablet by mouth 2 times daily. 10 tablet 0   • furosemide (Lasix) 20 MG tablet Take 1 tablet by mouth daily as needed (edema). 30 tablet 3   • celecoxib (CeleBREX) 200 MG capsule Take 1 capsule by mouth daily. (Patient not taking: Reported on 6/29/2021) 30 capsule 1   • tiZANidine (ZANAFLEX) 4 MG tablet Take 1 tablet by mouth nightly as needed (neck pain). (Patient not  mild taking: Reported on 6/29/2021) 30 tablet 0               ROS:      All other systems reviewed with the patient and son are negative except as mentioned in the HPI .                                            O  B  J  E  C T  I  V  E  :            PHYSICAL  EXAMINATION:       GENERAL: Alert,  in no apparent distress, not in pain, cooperative.       Visit Vitals  BP (!) 176/74 (BP Location: LUE - Left upper extremity, Patient Position: Sitting)   Pulse 64   Temp 97.5 °F (36.4 °C) (Oral)   Resp 14   Ht 5' 5\" (1.651 m)   Wt 78.2 kg   SpO2 94%   BMI 28.71 kg/m²          Weight    07/30/21 1215 07/30/21 1641   Weight: 83.1 kg 78.2 kg        HEAD: Normocephalic and nontender.     EYES: Pupils are equal and reactive to light and accomodation. Extraocular movements, no conj pallor, anicteric sclerae, conjunctivae are normal. Lids and lashes are normal.     EARS: Pinnae and external ears normal bilaterally, external auditory canals are normal,  and auditory acuity is grossly normal.     NOSE: External nose is normal to inspection, no septal deviation and anterior nares are normal.     MOUTH/THROAT: Tongue is midline and appears normal. Oropharynx appears normal. Soft palate and uvula are normal. Oral mucosa is normal. Gums and teeth appear normal. Oral mucosa moist and intact without thrush or mucositis.     NECK: Neck is supple. No thyromegaly, anterior cervical adenopathy, posterior cervical adenopathy, supraclavicular adenopathy, or carotid bruits noted. No jugular venous distention. No discomfort to palpation of the paracervical musculature and there is full range of motion.     CHEST: CTA. No use of accessory muscles. Contour is normal with normal AP diameter. Normal respiratory excursion, and respiratory effort is not labored.     HEART: Normal PMI. Normal rate and rhythm, S1 and S2 normal, no gross murmurs and no extra heart sounds. Normal 2+ peripheral pulses. No ankle edema.     ABDOMEN: Soft, normal active bowel  sounds, nontender, without masses, hepatomegaly or splenomegaly.     BACK:deferred     EXTREMITIES: No clubbing, no cyanosis, no edema. Normal muscle tone and development bilaterally in all 4 ext.     UROGENITAL: deferred     NEUROLOGIC: Cranial nerves 2 through 12 are grossly normal. Motor strength normal, DTR's normal and symmetric and no tremor noted.  Negative cerebellar tests.     LYMPH NODES:  No cervicaladenopathy.     SKIN:  Normal color, normal texture, normal turgor. No skin rashes, no atypical appearing skin lesions and no bruises.     PSYCHIATRIC: AAOx2, normal affect.        DATA  REVIEW:                 Recent Labs   Lab 07/30/21  1225 07/30/21  1219   WBC  --  6.4   HGB  --  12.2   HCT  --  35.7*   PLT  --  236   RBC  --  3.96*   MCV  --  90.2   MCH  --  30.8   MCHC  --  34.2   SODIUM  --  126*   CHLORIDE  --  95*   BUN  --  21*   CREATININE 0.80 0.80   CO2  --  29   POTASSIUM  --  4.5   BILIRUBIN  --  0.5   AST  --  30   ALBUMIN  --  3.6   ALKPT  --  83          CMP       Recent Labs   Lab 07/30/21  1225 07/30/21  1219   SODIUM  --  126*   CHLORIDE  --  95*   BUN  --  21*   GLUCOSE  --  90   POTASSIUM  --  4.5   CO2  --  29   CREATININE 0.80 0.80   CALCIUM  --  9.0          Recent Labs   Lab 07/30/21  1219   BILIRUBIN 0.5   AST 30   ALBUMIN 3.6   ALKPT 83   GPT 49          No results for input(s): INR in the last 72 hours.       Hemoglobin A1C (%)   Date Value   10/23/2020 5.5              XR Chest AP or PA   Final Result   IMPRESSION:    Hyperexpanded lungs with coarsened markings consistent with scarring and   obstructive pulmonary disease. No consolidation, pleural effusion or   pneumothorax.   2. Normal heart and vascularity..         CT CERVICAL SPINE WO CONTRAST    (Results Pending)   CT HEAD WO CONTRAST    (Results Pending)                 ASSESSMENT:         Krzysztof Kraft is a 89 year old female, with past medical history as outlined above, who presents with the following medical  problems:               1. Symptomatic acute hyponatremia in this rather normovolemic patient  - could be related to ACEI/HCTZ       - stop ACEI/HCTZ    - check CT head - hx of trauma ( to make sure no SDH)    - gentle IV saline     monitor sodium     TSH normal      2. Fall 1 month ago and neck pain  - check Ct C spine to further evaluate .      3. DVT prophylaxis  - Lovenox .       4. Condition guarded       5. Full code        observation status as predicted LOS 1-2 days     PT/OT          Clarence Lizama MD  [facility] Hospitalist  Phone #[phone]

## 2022-06-20 NOTE — ED ADULT TRIAGE NOTE - HEIGHT IN FEET
Urinalysis/CXR/Type and Screen/BMP/INR/CBC/PT/PTT/EKG/Hepatic Function/Spirometry
5
Depth Of Tumor Invasion (For Histology): tumor not visualized

## 2024-01-25 NOTE — PATIENT PROFILE ADULT - NSPRESCRALCFREQ_GEN_A_NUR
aerobic capacity/endurance/gait, locomotion, and balance/gross motor/joint integrity and mobility/muscle strength/ROM Never

## 2024-02-06 PROBLEM — I10 ESSENTIAL (PRIMARY) HYPERTENSION: Chronic | Status: ACTIVE | Noted: 2018-06-10

## 2024-02-06 RX ORDER — AMLODIPINE BESYLATE 2.5 MG/1
0 TABLET ORAL
Qty: 0 | Refills: 0 | DISCHARGE

## 2024-02-06 RX ORDER — ACETAMINOPHEN 500 MG
0 TABLET ORAL
Qty: 0 | Refills: 0 | DISCHARGE

## 2024-02-06 RX ORDER — CYCLOSPORINE 0.5 MG/ML
1 EMULSION OPHTHALMIC
Qty: 0 | Refills: 0 | DISCHARGE

## 2024-02-06 RX ORDER — BRINZOLAMIDE 10 MG/ML
1 SUSPENSION/ DROPS OPHTHALMIC
Qty: 0 | Refills: 0 | DISCHARGE

## 2024-02-06 RX ORDER — BUDESONIDE AND FORMOTEROL FUMARATE DIHYDRATE 160; 4.5 UG/1; UG/1
0 AEROSOL RESPIRATORY (INHALATION)
Qty: 0 | Refills: 0 | DISCHARGE

## 2024-02-06 RX ORDER — AMLODIPINE BESYLATE 2.5 MG/1
1 TABLET ORAL
Qty: 0 | Refills: 0 | DISCHARGE

## 2024-06-03 NOTE — ED PROVIDER NOTE - NEURO NEGATIVE STATEMENT, MLM
Patient called requesting CT scan of the abdomen/pelvis due to pelvic and vaginal pain she is experiencing at time of orgasm. Pain started 3 months ago.    Denies vaginal drainage/bleeding/odor.  Pain occurs with orgasm only per patient.    Patient states she experienced similar symptoms at the time of cervix cancer diagnosis and therefore requesting CT scan prior to office visit.   Patient states she was using Estrace cream as recommended by Ambrosio Vicente CNP but stopped using cream as she developed vaginal odor while using cream.   Odor has resolved since stopping Estrace.   Message routed to Stephanie Vicente CNP    6/4/24  14:26  Phoned patient to notify that Stephanie Vicente CNP recommends switching Estrace cream to Premarin cream in order to reduce vaginal odor.    Instructed patient to use Premarin cream 3 times/week and to follow up with Stephanie in 1-2  months.   Patient states she is scheduled to see Stephanie in September and will keep that appointment as scheduled.   Advised patient to call office back with increased pain, vaginal discharge/bleeding.   Notified patient that Stephanie will see patient in the office following Premarin use and will determine need to CT scan at that time.  
no dizziness or headache

## 2024-07-30 NOTE — ED PROVIDER NOTE - IV ALTEPLASE ADMIN OUTSIDE HIDDEN
Requested Prescriptions     Pending Prescriptions Disp Refills    traZODone (DESYREL) 100 MG tablet 90 tablet 0     Sig: TAKE ONE TABLET BY MOUTH NIGHTLY          Last OV: 12/13/2023     Last labs: 6/27/2023     F/u: N/A  
show